# Patient Record
Sex: FEMALE | Race: WHITE | Employment: OTHER | URBAN - METROPOLITAN AREA
[De-identification: names, ages, dates, MRNs, and addresses within clinical notes are randomized per-mention and may not be internally consistent; named-entity substitution may affect disease eponyms.]

---

## 2018-06-12 RX ORDER — DIPHENOXYLATE HYDROCHLORIDE AND ATROPINE SULFATE 2.5; .025 MG/1; MG/1
2 TABLET ORAL
COMMUNITY
End: 2019-09-18

## 2018-06-12 RX ORDER — BISMUTH SUBSALICYLATE 262 MG
1 TABLET,CHEWABLE ORAL DAILY
COMMUNITY
End: 2020-06-25 | Stop reason: ALTCHOICE

## 2018-06-12 RX ORDER — AMLODIPINE BESYLATE 10 MG/1
10 TABLET ORAL DAILY
COMMUNITY
End: 2019-09-18

## 2018-06-14 ENCOUNTER — ANESTHESIA EVENT (OUTPATIENT)
Dept: SURGERY | Age: 68
End: 2018-06-14
Payer: MEDICARE

## 2018-06-15 ENCOUNTER — APPOINTMENT (OUTPATIENT)
Dept: GENERAL RADIOLOGY | Age: 68
End: 2018-06-15
Attending: ORTHOPAEDIC SURGERY
Payer: MEDICARE

## 2018-06-15 ENCOUNTER — ANESTHESIA (OUTPATIENT)
Dept: SURGERY | Age: 68
End: 2018-06-15
Payer: MEDICARE

## 2018-06-15 ENCOUNTER — HOSPITAL ENCOUNTER (OUTPATIENT)
Age: 68
Setting detail: OUTPATIENT SURGERY
Discharge: HOME | End: 2018-06-15
Attending: ORTHOPAEDIC SURGERY | Admitting: ORTHOPAEDIC SURGERY
Payer: MEDICARE

## 2018-06-15 VITALS
HEIGHT: 64 IN | TEMPERATURE: 97.2 F | DIASTOLIC BLOOD PRESSURE: 85 MMHG | SYSTOLIC BLOOD PRESSURE: 141 MMHG | BODY MASS INDEX: 21.54 KG/M2 | RESPIRATION RATE: 16 BRPM | WEIGHT: 126.19 LBS | OXYGEN SATURATION: 98 % | HEART RATE: 66 BPM

## 2018-06-15 DIAGNOSIS — M77.42 METATARSALGIA OF LEFT FOOT: Primary | ICD-10-CM

## 2018-06-15 PROCEDURE — 74011000250 HC RX REV CODE- 250: Performed by: ORTHOPAEDIC SURGERY

## 2018-06-15 PROCEDURE — 77030018836 HC SOL IRR NACL ICUM -A: Performed by: ORTHOPAEDIC SURGERY

## 2018-06-15 PROCEDURE — 77030006773 HC BLD SAW OSC BRSM -A: Performed by: ORTHOPAEDIC SURGERY

## 2018-06-15 PROCEDURE — 77030032490 HC SLV COMPR SCD KNE COVD -B: Performed by: ORTHOPAEDIC SURGERY

## 2018-06-15 PROCEDURE — 77030020335 HC PDNG CST COVD -A: Performed by: ORTHOPAEDIC SURGERY

## 2018-06-15 PROCEDURE — 76060000034 HC ANESTHESIA 1.5 TO 2 HR: Performed by: ORTHOPAEDIC SURGERY

## 2018-06-15 PROCEDURE — C1713 ANCHOR/SCREW BN/BN,TIS/BN: HCPCS | Performed by: ORTHOPAEDIC SURGERY

## 2018-06-15 PROCEDURE — 74011250636 HC RX REV CODE- 250/636

## 2018-06-15 PROCEDURE — 76010000153 HC OR TIME 1.5 TO 2 HR: Performed by: ORTHOPAEDIC SURGERY

## 2018-06-15 PROCEDURE — 74011250637 HC RX REV CODE- 250/637: Performed by: NURSE ANESTHETIST, CERTIFIED REGISTERED

## 2018-06-15 PROCEDURE — 76210000021 HC REC RM PH II 0.5 TO 1 HR: Performed by: ORTHOPAEDIC SURGERY

## 2018-06-15 PROCEDURE — 77030012904 HC TAPE CST BSNM -A: Performed by: ORTHOPAEDIC SURGERY

## 2018-06-15 PROCEDURE — 74011250636 HC RX REV CODE- 250/636: Performed by: NURSE ANESTHETIST, CERTIFIED REGISTERED

## 2018-06-15 PROCEDURE — 74011000250 HC RX REV CODE- 250

## 2018-06-15 PROCEDURE — 74011250636 HC RX REV CODE- 250/636: Performed by: ORTHOPAEDIC SURGERY

## 2018-06-15 PROCEDURE — 77030014685 HC STIM BN GRWTH PHYSIO EXTERNAL ORTH -I1: Performed by: ORTHOPAEDIC SURGERY

## 2018-06-15 PROCEDURE — 73630 X-RAY EXAM OF FOOT: CPT

## 2018-06-15 PROCEDURE — 77030020754 HC CUF TRNQT 2BLA STRY -B: Performed by: ORTHOPAEDIC SURGERY

## 2018-06-15 PROCEDURE — 76210000006 HC OR PH I REC 0.5 TO 1 HR: Performed by: ORTHOPAEDIC SURGERY

## 2018-06-15 PROCEDURE — 77030020274 HC MISC IMPL ORTHOPEDIC: Performed by: ORTHOPAEDIC SURGERY

## 2018-06-15 DEVICE — GRAFT BNE SUB SM CANC FRZN MORSELIZED W/ VIABLE CELL: Type: IMPLANTABLE DEVICE | Site: FOOT | Status: FUNCTIONAL

## 2018-06-15 DEVICE — DYNAFORCE IS A NITINOL SUPERELASTIC BONE STAPLE FOR USE IN SURGERY OF THE HAND AND FOOT FOR BONE FRAGMENT OSTEOTOMY FIXATION AND JOINT ARTHRODESIS
Type: IMPLANTABLE DEVICE | Site: FOOT | Status: FUNCTIONAL
Brand: DYNAFORCE™

## 2018-06-15 RX ORDER — FAMOTIDINE 20 MG/1
TABLET, FILM COATED ORAL
Status: DISCONTINUED
Start: 2018-06-15 | End: 2018-06-15 | Stop reason: HOSPADM

## 2018-06-15 RX ORDER — CEFAZOLIN SODIUM 2 G/50ML
2 SOLUTION INTRAVENOUS
Status: COMPLETED | OUTPATIENT
Start: 2018-06-15 | End: 2018-06-15

## 2018-06-15 RX ORDER — OXYCODONE AND ACETAMINOPHEN 5; 325 MG/1; MG/1
1-2 TABLET ORAL
Qty: 50 TAB | Refills: 0 | Status: SHIPPED | OUTPATIENT
Start: 2018-06-15 | End: 2019-09-18

## 2018-06-15 RX ORDER — INSULIN LISPRO 100 [IU]/ML
INJECTION, SOLUTION INTRAVENOUS; SUBCUTANEOUS ONCE
Status: DISCONTINUED | OUTPATIENT
Start: 2018-06-15 | End: 2018-06-15 | Stop reason: HOSPADM

## 2018-06-15 RX ORDER — HYDROMORPHONE HYDROCHLORIDE 2 MG/ML
0.5 INJECTION, SOLUTION INTRAMUSCULAR; INTRAVENOUS; SUBCUTANEOUS
Status: DISCONTINUED | OUTPATIENT
Start: 2018-06-15 | End: 2018-06-15 | Stop reason: HOSPADM

## 2018-06-15 RX ORDER — LIDOCAINE HYDROCHLORIDE 20 MG/ML
INJECTION, SOLUTION EPIDURAL; INFILTRATION; INTRACAUDAL; PERINEURAL AS NEEDED
Status: DISCONTINUED | OUTPATIENT
Start: 2018-06-15 | End: 2018-06-15 | Stop reason: HOSPADM

## 2018-06-15 RX ORDER — BUPIVACAINE HYDROCHLORIDE 5 MG/ML
INJECTION, SOLUTION EPIDURAL; INTRACAUDAL AS NEEDED
Status: DISCONTINUED | OUTPATIENT
Start: 2018-06-15 | End: 2018-06-15 | Stop reason: HOSPADM

## 2018-06-15 RX ORDER — SODIUM CHLORIDE 0.9 % (FLUSH) 0.9 %
5-10 SYRINGE (ML) INJECTION AS NEEDED
Status: DISCONTINUED | OUTPATIENT
Start: 2018-06-15 | End: 2018-06-15 | Stop reason: HOSPADM

## 2018-06-15 RX ORDER — NALOXONE HYDROCHLORIDE 4 MG/.1ML
SPRAY NASAL
Qty: 1 EACH | Refills: 0 | Status: SHIPPED | OUTPATIENT
Start: 2018-06-15 | End: 2019-09-18

## 2018-06-15 RX ORDER — PROPOFOL 10 MG/ML
INJECTION, EMULSION INTRAVENOUS AS NEEDED
Status: DISCONTINUED | OUTPATIENT
Start: 2018-06-15 | End: 2018-06-15 | Stop reason: HOSPADM

## 2018-06-15 RX ORDER — SODIUM CHLORIDE, SODIUM LACTATE, POTASSIUM CHLORIDE, CALCIUM CHLORIDE 600; 310; 30; 20 MG/100ML; MG/100ML; MG/100ML; MG/100ML
100 INJECTION, SOLUTION INTRAVENOUS CONTINUOUS
Status: DISCONTINUED | OUTPATIENT
Start: 2018-06-15 | End: 2018-06-15 | Stop reason: HOSPADM

## 2018-06-15 RX ORDER — FENTANYL CITRATE 50 UG/ML
INJECTION, SOLUTION INTRAMUSCULAR; INTRAVENOUS AS NEEDED
Status: DISCONTINUED | OUTPATIENT
Start: 2018-06-15 | End: 2018-06-15 | Stop reason: HOSPADM

## 2018-06-15 RX ORDER — ONDANSETRON 2 MG/ML
INJECTION INTRAMUSCULAR; INTRAVENOUS AS NEEDED
Status: DISCONTINUED | OUTPATIENT
Start: 2018-06-15 | End: 2018-06-15 | Stop reason: HOSPADM

## 2018-06-15 RX ORDER — ONDANSETRON 2 MG/ML
4 INJECTION INTRAMUSCULAR; INTRAVENOUS ONCE
Status: DISCONTINUED | OUTPATIENT
Start: 2018-06-15 | End: 2018-06-15 | Stop reason: HOSPADM

## 2018-06-15 RX ORDER — SODIUM CHLORIDE, SODIUM LACTATE, POTASSIUM CHLORIDE, CALCIUM CHLORIDE 600; 310; 30; 20 MG/100ML; MG/100ML; MG/100ML; MG/100ML
50 INJECTION, SOLUTION INTRAVENOUS CONTINUOUS
Status: DISCONTINUED | OUTPATIENT
Start: 2018-06-16 | End: 2018-06-15 | Stop reason: HOSPADM

## 2018-06-15 RX ORDER — LIDOCAINE HYDROCHLORIDE 10 MG/ML
INJECTION INFILTRATION; PERINEURAL AS NEEDED
Status: DISCONTINUED | OUTPATIENT
Start: 2018-06-15 | End: 2018-06-15 | Stop reason: HOSPADM

## 2018-06-15 RX ORDER — SODIUM CHLORIDE 0.9 % (FLUSH) 0.9 %
5-10 SYRINGE (ML) INJECTION EVERY 8 HOURS
Status: DISCONTINUED | OUTPATIENT
Start: 2018-06-15 | End: 2018-06-15 | Stop reason: HOSPADM

## 2018-06-15 RX ORDER — NALOXONE HYDROCHLORIDE 0.4 MG/ML
0.1 INJECTION, SOLUTION INTRAMUSCULAR; INTRAVENOUS; SUBCUTANEOUS AS NEEDED
Status: DISCONTINUED | OUTPATIENT
Start: 2018-06-15 | End: 2018-06-15 | Stop reason: HOSPADM

## 2018-06-15 RX ORDER — EPHEDRINE SULFATE 50 MG/ML
INJECTION, SOLUTION INTRAVENOUS AS NEEDED
Status: DISCONTINUED | OUTPATIENT
Start: 2018-06-15 | End: 2018-06-15 | Stop reason: HOSPADM

## 2018-06-15 RX ORDER — FLUMAZENIL 0.1 MG/ML
0.2 INJECTION INTRAVENOUS
Status: DISCONTINUED | OUTPATIENT
Start: 2018-06-15 | End: 2018-06-15 | Stop reason: HOSPADM

## 2018-06-15 RX ORDER — ONDANSETRON 4 MG/1
4 TABLET, ORALLY DISINTEGRATING ORAL
Qty: 15 TAB | Refills: 0 | Status: SHIPPED | OUTPATIENT
Start: 2018-06-15 | End: 2019-09-18

## 2018-06-15 RX ORDER — MIDAZOLAM HYDROCHLORIDE 1 MG/ML
INJECTION, SOLUTION INTRAMUSCULAR; INTRAVENOUS AS NEEDED
Status: DISCONTINUED | OUTPATIENT
Start: 2018-06-15 | End: 2018-06-15 | Stop reason: HOSPADM

## 2018-06-15 RX ORDER — DEXTROSE MONOHYDRATE 25 G/50ML
25-50 INJECTION, SOLUTION INTRAVENOUS AS NEEDED
Status: DISCONTINUED | OUTPATIENT
Start: 2018-06-15 | End: 2018-06-15 | Stop reason: HOSPADM

## 2018-06-15 RX ORDER — FAMOTIDINE 20 MG/1
20 TABLET, FILM COATED ORAL ONCE
Status: COMPLETED | OUTPATIENT
Start: 2018-06-16 | End: 2018-06-15

## 2018-06-15 RX ORDER — MAGNESIUM SULFATE 100 %
4 CRYSTALS MISCELLANEOUS AS NEEDED
Status: DISCONTINUED | OUTPATIENT
Start: 2018-06-15 | End: 2018-06-15 | Stop reason: HOSPADM

## 2018-06-15 RX ADMIN — FENTANYL CITRATE 25 MCG: 50 INJECTION, SOLUTION INTRAMUSCULAR; INTRAVENOUS at 13:04

## 2018-06-15 RX ADMIN — FENTANYL CITRATE 50 MCG: 50 INJECTION, SOLUTION INTRAMUSCULAR; INTRAVENOUS at 12:21

## 2018-06-15 RX ADMIN — MIDAZOLAM HYDROCHLORIDE 2 MG: 1 INJECTION, SOLUTION INTRAMUSCULAR; INTRAVENOUS at 11:19

## 2018-06-15 RX ADMIN — FENTANYL CITRATE 50 MCG: 50 INJECTION, SOLUTION INTRAMUSCULAR; INTRAVENOUS at 11:35

## 2018-06-15 RX ADMIN — SODIUM CHLORIDE, SODIUM LACTATE, POTASSIUM CHLORIDE, AND CALCIUM CHLORIDE: 600; 310; 30; 20 INJECTION, SOLUTION INTRAVENOUS at 12:55

## 2018-06-15 RX ADMIN — LIDOCAINE HYDROCHLORIDE 40 MG: 20 INJECTION, SOLUTION EPIDURAL; INFILTRATION; INTRACAUDAL; PERINEURAL at 11:27

## 2018-06-15 RX ADMIN — CEFAZOLIN SODIUM 2 G: 2 SOLUTION INTRAVENOUS at 11:19

## 2018-06-15 RX ADMIN — FENTANYL CITRATE 50 MCG: 50 INJECTION, SOLUTION INTRAMUSCULAR; INTRAVENOUS at 11:39

## 2018-06-15 RX ADMIN — FAMOTIDINE 20 MG: 20 TABLET ORAL at 09:42

## 2018-06-15 RX ADMIN — ONDANSETRON 4 MG: 2 INJECTION INTRAMUSCULAR; INTRAVENOUS at 11:32

## 2018-06-15 RX ADMIN — PROPOFOL 50 MG: 10 INJECTION, EMULSION INTRAVENOUS at 11:31

## 2018-06-15 RX ADMIN — EPHEDRINE SULFATE 10 MG: 50 INJECTION, SOLUTION INTRAVENOUS at 11:44

## 2018-06-15 RX ADMIN — FENTANYL CITRATE 25 MCG: 50 INJECTION, SOLUTION INTRAMUSCULAR; INTRAVENOUS at 12:49

## 2018-06-15 RX ADMIN — PROPOFOL 150 MG: 10 INJECTION, EMULSION INTRAVENOUS at 11:27

## 2018-06-15 RX ADMIN — SODIUM CHLORIDE, SODIUM LACTATE, POTASSIUM CHLORIDE, AND CALCIUM CHLORIDE 50 ML/HR: 600; 310; 30; 20 INJECTION, SOLUTION INTRAVENOUS at 10:00

## 2018-06-15 NOTE — DISCHARGE INSTRUCTIONS
DISCHARGE SUMMARY from Nurse    PATIENT INSTRUCTIONS:    After general anesthesia or intravenous sedation, for 24 hours or while taking prescription Narcotics:  · Limit your activities  · Do not drive and operate hazardous machinery  · Do not make important personal or business decisions  · Do  not drink alcoholic beverages  · If you have not urinated within 8 hours after discharge, please contact your surgeon on call. Report the following to your surgeon:  · Excessive pain, swelling, redness or odor of or around the surgical area  · Temperature over 100.5  · Nausea and vomiting lasting longer than 4 hours or if unable to take medications  · Any signs of decreased circulation or nerve impairment to extremity: change in color, persistent  numbness, tingling, coldness or increase pain  · Any questions    What to do at Home:      These are general instructions for a healthy lifestyle:    No smoking/ No tobacco products/ Avoid exposure to second hand smoke  Surgeon General's Warning:  Quitting smoking now greatly reduces serious risk to your health. Obesity, smoking, and sedentary lifestyle greatly increases your risk for illness    A healthy diet, regular physical exercise & weight monitoring are important for maintaining a healthy lifestyle    You may be retaining fluid if you have a history of heart failure or if you experience any of the following symptoms:  Weight gain of 3 pounds or more overnight or 5 pounds in a week, increased swelling in our hands or feet or shortness of breath while lying flat in bed. Please call your doctor as soon as you notice any of these symptoms; do not wait until your next office visit. Recognize signs and symptoms of STROKE:    F-face looks uneven    A-arms unable to move or move unevenly    S-speech slurred or non-existent    T-time-call 911 as soon as signs and symptoms begin-DO NOT go       Back to bed or wait to see if you get better-TIME IS BRAIN.     Warning Signs of HEART ATTACK     Call 911 if you have these symptoms:   Chest discomfort. Most heart attacks involve discomfort in the center of the chest that lasts more than a few minutes, or that goes away and comes back. It can feel like uncomfortable pressure, squeezing, fullness, or pain.  Discomfort in other areas of the upper body. Symptoms can include pain or discomfort in one or both arms, the back, neck, jaw, or stomach.  Shortness of breath with or without chest discomfort.  Other signs may include breaking out in a cold sweat, nausea, or lightheadedness. Don't wait more than five minutes to call 911 - MINUTES MATTER! Fast action can save your life. Calling 911 is almost always the fastest way to get lifesaving treatment. Emergency Medical Services staff can begin treatment when they arrive -- up to an hour sooner than if someone gets to the hospital by car. The discharge information has been reviewed with the patient. The patient verbalized understanding. Discharge medications reviewed with the patient and appropriate educational materials and side effects teaching were provided. ___________________________________________________________________________________________________________________________________  Patient armband removed and given to patient to take home.   Patient was informed of the privacy risks if armband lost or stolen

## 2018-06-15 NOTE — IP AVS SNAPSHOT
[address] [place] [address] Dana Eubanksjatin Murphy 
[phone] Patient: Lidia Alvarado MRN: [id number] About your hospitalization You were admitted on:  Sheridan 15, 2018 You last received care in the:  [facility] PACU You were discharged on:  Sheridan 15, 2018 Why you were hospitalized Your primary diagnosis was:  Not on File Follow-up Information Follow up With Details Comments Contact Info Eric Taylor MD   [address] [address] 
[phone] Discharge Orders None A check shivam indicates which time of day the medication should be taken. My Medications START taking these medications Instructions Each Dose to Equal  
 Morning Noon Evening Bedtime  
 naloxone 4 mg/actuation nasal spray Commonly known as:  [employer] Your last dose was: Your next dose is:    
   
   
 Use 1 spray intranasally into 1 nostril. Use a new Narcan nasal spray for subsequent doses and administer into alternating nostrils. May repeat every 2 to 3 minutes as needed. Indications: OPIATE-INDUCED RESPIRATORY DEPRESSION  
     
   
   
   
  
 ondansetron 4 mg disintegrating tablet Commonly known as:  ZOFRAN ODT Your last dose was: Your next dose is: Take 1 Tab by mouth every six (6) hours as needed for Nausea. 4 mg  
    
   
   
   
  
 oxyCODONE-acetaminophen 5-325 mg per tablet Commonly known as:  PERCOCET Your last dose was: Your next dose is: Take 1-2 Tabs by mouth every four (4) hours as needed for Pain. Max Daily Amount: 12 Tabs. 1-2 Tab CONTINUE taking these medications Instructions Each Dose to Equal  
 Morning Noon Evening Bedtime  
 amLODIPine 10 mg tablet Commonly known as:  George Pacheco Your last dose was: Your next dose is: Take 10 mg by mouth daily.   
 10 mg  
 CALCIUM PO Your last dose was: Your next dose is: Take  by mouth. diphenoxylate-atropine 2.5-0.025 mg per tablet Commonly known as:  LOMOTIL Your last dose was: Your next dose is: Take 2 Tabs by mouth four (4) times daily as needed for Diarrhea. 2 Tab  
    
   
   
   
  
 multivitamin tablet Commonly known as:  ONE A DAY Your last dose was: Your next dose is: Take 1 Tab by mouth daily. 1 Tab  
    
   
   
   
  
 traZODone 100 mg tablet Commonly known as:  Rayma Medal Your last dose was: Your next dose is: Take 50 mg by mouth nightly. 50 mg  
    
   
   
   
  
 VITAMIN D2 PO Your last dose was: Your next dose is: Take  by mouth. Where to Get Your Medications Information on where to get these meds will be given to you by the nurse or doctor. ! Ask your nurse or doctor about these medications  
  naloxone 4 mg/actuation nasal spray  
 ondansetron 4 mg disintegrating tablet  
 oxyCODONE-acetaminophen 5-325 mg per tablet Opioid Education Prescription Opioids: What You Need to Know: 
 
 
After general anesthesia or intravenous sedation, for 24 hours or while taking prescription Narcotics: · Limit your activities · Do not drive and operate hazardous machinery · Do not make important personal or business decisions · Do  not drink alcoholic beverages · If you have not urinated within 8 hours after discharge, please contact your surgeon on call. Report the following to your surgeon: · Excessive pain, swelling, redness or odor of or around the surgical area · Temperature over 100.5 · Nausea and vomiting lasting longer than 4 hours or if unable to take medications · Any signs of decreased circulation or nerve impairment to extremity: change in color, persistent  numbness, tingling, coldness or increase pain · Any questions What to do at Home: These are general instructions for a healthy lifestyle: No smoking/ No tobacco products/ Avoid exposure to second hand smoke Surgeon General's Warning:  Quitting smoking now greatly reduces serious risk to your health. Obesity, smoking, and sedentary lifestyle greatly increases your risk for illness A healthy diet, regular physical exercise & weight monitoring are important for maintaining a healthy lifestyle You may be retaining fluid if you have a history of heart failure or if you experience any of the following symptoms:  Weight gain of 3 pounds or more overnight or 5 pounds in a week, increased swelling in our hands or feet or shortness of breath while lying flat in bed. Please call your doctor as soon as you notice any of these symptoms; do not wait until your next office visit. Recognize signs and symptoms of STROKE: 
 
F-face looks uneven A-arms unable to move or move unevenly S-speech slurred or non-existent T-time-call 911 as soon as signs and symptoms begin-DO NOT go Back to bed or wait to see if you get better-TIME IS BRAIN. Warning Signs of HEART ATTACK Call 911 if you have these symptoms: 
? Chest discomfort. Most heart attacks involve discomfort in the center of the chest that lasts more than a few minutes, or that goes away and comes back. It can feel like uncomfortable pressure, squeezing, fullness, or pain. ? Discomfort in other areas of the upper body. Symptoms can include pain or discomfort in one or both arms, the back, neck, jaw, or stomach. ? Shortness of breath with or without chest discomfort. ? Other signs may include breaking out in a cold sweat, nausea, or lightheadedness. Don't wait more than five minutes to call [address]! Fast action can save your life. Calling 911 is almost always the fastest way to get lifesaving treatment. Emergency Medical Services staff can begin treatment when they arrive  up to an hour sooner than if someone gets to the hospital by car. The discharge information has been reviewed with the patient. The patient verbalized understanding. Discharge medications reviewed with the patient and appropriate educational materials and side effects teaching were provided. ___________________________________________________________________________________________________________________________________ Patient armband removed and given to patient to take home. Patient was informed of the privacy risks if armband lost or stolen Introducing [facility]! Dear Krupa Victor Hugo: Thank you for requesting a Attensa account. Our records indicate that you already have an active Attensa account. You can access your account anytime at [email or web address] Did you know that you can access your hospital and ER discharge instructions at any time in Attensa? You can also review all of your test results from your hospital stay or ER visit. Additional Information If you have questions, please visit the Frequently Asked Questions section of the Attensa website at [email or web address] Remember, Attensa is NOT to be used for urgent needs. For medical emergencies, dial 911. Now available from your iPhone and Android! Introducing Joel Jones As a [facility]ureen Reusing patient, I wanted to make you aware of our electronic visit tool called Joel Jones. [facility] 24/7 allows you to connect within minutes with a medical provider 24 hours a day, seven days a week via a mobile device or tablet or logging into a secure website from your computer. You can access [employer] from anywhere in the [place]. A virtual visit might be right for you when you have a simple condition and feel like you just dont want to get out of bed, or cant get away from work for an appointment, when your regular Thersia Candelaria provider is not available (evenings, weekends or holidays), or when youre out of town and need minor care. Electronic visits cost only $49 and if the [employer] 24/7 provider determines a prescription is needed to treat your condition, one can be electronically transmitted to a nearby pharmacy*. Please take a moment to enroll today if you have not already done so. The enrollment process is free and takes just a few minutes. To enroll, please download the [employer] 24/7 sav to your tablet or phone, or visit [email or web address] to enroll on your computer. And, as an [address] [place] [address] patient with a Snoobe account, the results of your visits will be scanned into your electronic medical record and your primary care provider will be able to view the scanned results. We urge you to continue to see your regular Thersia Candelaria provider for your ongoing medical care. And while your primary care provider may not be the one available when you seek a [employer] virtual visit, the peace of mind you get from getting a real diagnosis real time can be priceless. For more information on Solar[employer], view our Frequently Asked Questions (FAQs) at [email or web address]. Sincerely, 
 
Michelle Robertson MD 
Chief Medical Officer [address] *:  certain medications cannot be prescribed via [employer] Unresulted tests-please follow up with your PCP on these results Procedure/Test Authorizing Provider  [place] XR TECHNOLOGIST Lavon Nuñez MD  
 XR FOOT LT MIN 3 V Flaca Chowdary MD  
  
Providers Seen During Your Hospitalization Provider Specialty Primary office phone Flaca Chowdary MD Orthopedic Surgery [phone] Your Primary Care Physician (PCP) Primary Care Physician Office Phone Office Fax Justyna Joshua [phone] You are allergic to the following No active allergies Recent Documentation Height Weight BMI OB Status Smoking Status 1.626 m 57.2 kg 21.66 kg/m2 Postmenopausal Never Smoker Emergency Contacts Name Discharge Info Relation Home Work Mobile [address] [facility]. CAREGIVER [3] Friend [[phone] Patient Belongings The following personal items are in your possession at time of discharge: 
  Dental Appliances: None  Visual Aid: Glasses      Home Medications: None   Jewelry: None  Clothing: Undergarments, Pants, Shirt, Slippers    Other Valuables: Home Medical Equipment(comment) (post op shoe and boot) Please provide this summary of care documentation to your next provider. Signatures-by signing, you are acknowledging that this After Visit Summary has been reviewed with you and you have received a copy. Patient Signature:  ____________________________________________________________ Date:  ____________________________________________________________  
  
Celesy Vaughn Provider Signature:  ____________________________________________________________ Date:  ____________________________________________________________

## 2018-06-15 NOTE — ANESTHESIA POSTPROCEDURE EVALUATION
Post-Anesthesia Evaluation and Assessment    Patient: Lidia Alvarado MRN: [id number]  SSN: [id number]    YOB: 1950  Age: 70 y.o. Sex: female      Data from PACU flowsheet    Cardiovascular Function/Vital Signs  Visit Vitals    /81    Pulse 85    Temp 36.2 °C (97.2 °F)    Resp 12    Ht 5' 4\" (1.626 m)    Wt 57.2 kg (126 lb 3 oz)    SpO2 97%    BMI 21.66 kg/m2       Patient is status post anesthesia    Nausea/Vomiting: controlled    Postoperative hydration reviewed and adequate. Pain:  Managed    Neurological Status: At baseline    Mental Status and Level of Consciousness: Alert and oriented     Pulmonary Status:   Adequate oxygenation and airway patent    Complications related to anesthesia: None    Post-anesthesia assessment completed.  No concerns    Signed By: Susanne Miranda CRNA     Sheridan 15, 2018

## 2018-06-15 NOTE — H&P
H&P Update:  Refugio Stock was seen and examined. History and physical has been reviewed. The patient has been examined. There have been no significant clinical changes since the completion of the originally dated History and Physical.  Dr Rosa Isela Harris has done H&P within 30 days - patient cleared.     Signed By: Beba Ascencio MD     Sheridan 15, 2018 9:35 AM

## 2018-06-15 NOTE — ANESTHESIA PREPROCEDURE EVALUATION
Anesthetic History     PONV          Review of Systems / Medical History  Patient summary reviewed, nursing notes reviewed and pertinent labs reviewed    Pulmonary          Shortness of breath         Neuro/Psych   Within defined limits           Cardiovascular    Hypertension: poorly controlled                   GI/Hepatic/Renal     GERD: well controlled           Endo/Other        Arthritis     Other Findings   Comments: Documentation of current medication  Current medications obtained, documented and obtained? YES      Risk Factors for Postoperative nausea/vomiting:       History of postoperative nausea/vomiting? yes       Female? YES       Motion sickness? NO       Intended opioid administration for postoperative analgesia? NO      Smoking Abstinence:  Current Smoker? NO  Elective Surgery? YES  Seen preoperatively by anesthesiologist or proxy prior to day of surgery? YES  Pt abstained from smoking 24 hours prior to anesthesia?  N/A    Preventive care/screening for High Blood Pressure:  Aged 18 years and older: YES  Screened for high blood pressure: YES  Patients with high blood pressure referred to primary care provider   for BP management: YES                 Physical Exam    Airway  Mallampati: II  TM Distance: 4 - 6 cm  Neck ROM: normal range of motion   Mouth opening: Normal     Cardiovascular  Regular rate and rhythm,  S1 and S2 normal,  no murmur, click, rub, or gallop  Rhythm: regular  Rate: normal         Dental  No notable dental hx       Pulmonary  Breath sounds clear to auscultation               Abdominal  GI exam deferred       Other Findings            Anesthetic Plan    ASA: 3  Anesthesia type: general          Induction: Intravenous  Anesthetic plan and risks discussed with: Patient

## 2018-06-15 NOTE — BRIEF OP NOTE
BRIEF OPERATIVE NOTE    Date of Procedure: 6/15/2018   Preoperative Diagnosis: m79.605,m21.612,m19.072,z98.890  Postoperative Diagnosis: * No post-op diagnosis entered *    Procedure(s):  2/3 METATARSAL shortening OSTEOTOMies and akin osteotomy with ankle block  Surgeon(s) and Role:     * Glory Mead MD - Primary         Surgical Assistant: Myriam Kim    Surgical Staff:  Circ-1: Luciana Nuno  Radiology Technician: Yudi Mcclellan  Scrub Tech-Relief: Keith Salvador  Surg Asst-1: Blank Archibald  Event Time In   Incision Start 1142   Incision Close      Anesthesia: General   Estimated Blood Loss: 10 mL  Specimens: * No specimens in log *   Findings: good correction of metatarsal station   Complications: none  Implants:   Implant Name Type Inv.  Item Serial No.  Lot No. LRB No. Used Action   IMPL KT [id number] -- DYNA[employer]CE - [id number]  IMPL KT [id number] -- DYNAFORCE  [employer] [id number] N/A 1 Implanted   Corical headed screw 02.4mm x18mm     [id number] N/A 1 Implanted   Locking screw 02.4mm x 16mm     [id number] Left 1 Implanted   Locking screw 02.4mm x 12mm     [id number] Left 1 Implanted   Locking Screw 02.4mmx 16mm     [id number] Left 1 Implanted   Headed screw      [id number] Left 1 Implanted   Headed Screw     [id number] Left 1 Implanted   Locking Screw 02.4mm x 14mm     [id number] Left 1 Implanted   MSPMetatarsal Shortening System      [id number] Left 1 Implanted   MSP Metarsal Shortening System      [id number] Left 1 Implanted   Headed Screw 02.4mm x 18mm     [id number] Left 1 Implanted   GRAFT BNE ELITE MIAN SM --  - [id number]   GRAFT BNE ELITE MIAN SM --  [id number] MUSCULOSKELETAL TRANS   Left 1 Implanted

## 2018-06-15 NOTE — PERIOP NOTES
Rec'd care of pt from OR via stretcher. Attached to monitor. VSS. OR, MAR and anesthesia report acknowledged. Will cont to monitor.

## 2018-06-16 NOTE — OP NOTES
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 POSTOPERATIVE DIAGNOSIS:  Second and third metatarsalgia and hallux valgus interphalangeus deformity, left foot -- patient with previous history of a complex forefoot reconstruction surgery. PROCEDURE PERFORMED:  Left 2nd and 3rd metatarsal shortening osteotomies and Akin osteotomy. SURGEON:  Tiffany Poole MD    ASSISTANT:  Naveed Dallas CSA    ANESTHESIA:  LMA with ankle block. ESTIMATED BLOOD LOSS:  Minimal.    FLUIDS:  Crystalloid. URINE OUTPUT:  None. SPECIMENS REMOVED:  None. FINDINGS:  Good correction in metatarsal position achieved -- great toe no longer impinging on the second. COMPLICATIONS:  None. CONDITION:  Stable to PACU.    ESTIMATED BLOOD LOSS:  10 mL. IMPLANTS:  none    INDICATIONS AND HISTORY:  The patient is a 27-year-old who has previously undergone multiple midfoot surgeries and forefoot surgeries by Dr. Jaleel Stauffer. She presented to me with persistent pain under the second and third toes, and complaints that the great toe was impinging on the second toe causing discomfort. She has already been treated with a toe spacer in addition to custom orthotics, metatarsal pads and shoe wear modification by Dr. Jaleel Stauffer. After discussing the alternatives, my recommendation was for shortening osteotomies of the 2nd and 3rd metatarsals and a large Akin osteotomy to correct her issues. Prior to surgery, I discussed in clear and certain terms that she has severe foot problems and there is no surgery available that will make her foot normal.  I think Dr. Jaleel Stauffer has done a fantastic job of correcting her severe midfoot deformity.   At this point, my goal was to finetune the forefoot to try to minimize pain. I clearly expressed to her that I had no expectations that she would be able to walk barefoot in the future without pain under any of her metatarsal heads due to fat pad atrophy. I additionally expressed that I think she will likely need lifetime custom orthotics due to the unusual shape of her foot. That being said, I thought osteotomies of the 2nd and 3rd metatarsals to shorten them would help with the significant and metatarsalgia symptoms she was experiencing under both of them. The Baljit osteotomy, I thought, would be a reliable way to move the great toe off the second. I discussed the possibility of nonunion, wound healing complications, nerve injury, the need for future surgery, PE, DVT, chronic pain and the loss of limb or life. DESCRIPTION OF PROCEDURE:  The patient was identified and the procedure was verified. After successful induction of LMA anesthesia, the patient's left foot was prepped and draped in routine orthopedic sterile fashion. I performed an ankle block. An Esmarch was used to exsanguinate the extremity and ankle tourniquet was inflated. I made an incision over the dorsal midfoot through a previous incision Dr. Victor M Bonilla had made. Through this, I was able to easily access the 2nd and 3rd metatarsal shafts dorsally. Bovie electrocautery was used to coagulate some bleeding vessels and scar tissue. The 2nd metatarsal was addressed first.  With it exposed, I placed an extremity metatarsal shortening plate and confirmed its position radiographically. I then fixated it distally with 2 bicortical locking screws and proximally with a bicortical nonlocking screw. I then backed the screw off several turns so the plate would be able to translate proximally. At this point in time, I used the microsagittal saw to create an oblique osteotomy using the plate as a guide.   Once the osteotomy was completed, I translated the plate proximally, until plantarly it felt like the 2nd metatarsal head was at an equal height as the 1st.  I then tightened down the proximal screw in the oval-shaped hole. I then placed a second screw proximally, and this was a nonlocking screw. I compressed the osteotomy and then drilled this screw eccentrically on the lateral side of the hole. This screw was inserted, had excellent purchase and compressed the osteotomy nicely. It should be noted that a small amount of Nely Elite was packed into the osteotomy prior to doing this, and then around the osteotomy. At this point in time, I performed the identical procedure for the 3rd metatarsal.  At this point, attention was turned to the Akin osteotomy. A medial incision was made along the hallux. The medial aspect of the proximal phalanx of the hallux was carefully exposed, and blunt dissection was performed to protect any local nerve branches or arteries. Two Hohmann retractors were placed to protect the surrounding soft tissue, and I performed an Akin osteotomy -- I intentionally completed the osteotomy to provide slight lateral translation of the distal fragment and fixated it with a [place] staple. With pressure under the 4th metatarsal head, the great toe sat off of the second toe with no impingement. With palpation under the surface of the metatarsals, there was no prominence of any metatarsal -- all were at an equal height. At this point, final x-rays were obtained, the wounds were irrigated and closed in a layered fashion and a well-padded dressing was applied. POSTOPERATIVE PLAN:  The patient will go into a Cam boot. We will keep her in the Cam boot until her 2-month postop visit, at which point x-rays will be obtained, and subsequent use of the Cam boot will be determined based on evidence of healing. The patient was instructed and DVT prophylaxis and to move when awake and use aspirin for DVT prophylaxis.       Milagros Rousseau, MD [facility]  D: 06/16/2018 11:15     T: 06/16/2018 11:55  JOB #: [id number]

## 2019-11-22 ENCOUNTER — OFFICE VISIT (OUTPATIENT)
Dept: FAMILY MEDICINE CLINIC | Age: 69
End: 2019-11-22

## 2019-11-22 VITALS
OXYGEN SATURATION: 97 % | HEART RATE: 71 BPM | HEIGHT: 64 IN | DIASTOLIC BLOOD PRESSURE: 87 MMHG | BODY MASS INDEX: 22.71 KG/M2 | WEIGHT: 133 LBS | SYSTOLIC BLOOD PRESSURE: 141 MMHG | RESPIRATION RATE: 16 BRPM | TEMPERATURE: 96.7 F

## 2019-11-22 DIAGNOSIS — G47.9 SLEEP DISTURBANCE: ICD-10-CM

## 2019-11-22 DIAGNOSIS — I10 ESSENTIAL HYPERTENSION: Primary | ICD-10-CM

## 2019-11-22 DIAGNOSIS — E78.49 OTHER HYPERLIPIDEMIA: ICD-10-CM

## 2019-11-22 DIAGNOSIS — N39.498 OTHER URINARY INCONTINENCE: ICD-10-CM

## 2019-11-22 RX ORDER — LISINOPRIL 10 MG/1
10 TABLET ORAL DAILY
Qty: 90 TAB | Refills: 1 | Status: SHIPPED | OUTPATIENT
Start: 2019-11-22 | End: 2020-05-26 | Stop reason: SDUPTHER

## 2019-11-22 RX ORDER — LANOLIN ALCOHOL/MO/W.PET/CERES
3 CREAM (GRAM) TOPICAL
COMMUNITY

## 2019-11-22 NOTE — PROGRESS NOTES
Rodolfo Baker is a 78 y.o. female (: 1950) presenting to address:    Chief Complaint   Patient presents with    Establish Care    Hypertension       Vitals:    19 1322   BP: 141/87   Pulse: 71   Resp: 16   Temp: 96.7 °F (35.9 °C)   TempSrc: Oral   SpO2: 97%   Weight: 133 lb (60.3 kg)   Height: 5' 3.5\" (1.613 m)   PainSc:   0 - No pain       Hearing/Vision:   No exam data present    Learning Assessment:     Learning Assessment 2019   PRIMARY LEARNER Patient   HIGHEST LEVEL OF EDUCATION - PRIMARY LEARNER  GRADUATED HIGH SCHOOL OR GED   BARRIERS PRIMARY LEARNER NONE   PRIMARY LANGUAGE ENGLISH   LEARNER PREFERENCE PRIMARY READING   ANSWERED BY patient    RELATIONSHIP SELF     Depression Screening:     3 most recent PHQ Screens 2019   Little interest or pleasure in doing things Not at all   Feeling down, depressed, irritable, or hopeless Not at all   Total Score PHQ 2 0     Fall Risk Assessment:     Fall Risk Assessment, last 12 mths 2019   Able to walk? Yes   Fall in past 12 months? Yes   Fall with injury? No   Number of falls in past 12 months 1   Fall Risk Score 1     Abuse Screening:     Abuse Screening Questionnaire 2019   Do you ever feel afraid of your partner? N   Are you in a relationship with someone who physically or mentally threatens you? N   Is it safe for you to go home? Y     Coordination of Care Questionaire:   1. Have you been to the ER, urgent care clinic since your last visit? Hospitalized since your last visit? NO    2. Have you seen or consulted any other health care providers outside of the [address] [place] [address] since your last visit? Include any pap smears or colon screening. NO    Advanced Directive:   1. Do you have an Advanced Directive? YES    2. Would you like information on Advanced Directives?  NO

## 2019-11-22 NOTE — PATIENT INSTRUCTIONS
High Blood Pressure: Care Instructions Overview It's normal for blood pressure to go up and down throughout the day. But if it stays up, you have high blood pressure. Another name for high blood pressure is hypertension. Despite what a lot of people think, high blood pressure usually doesn't cause headaches or make you feel dizzy or lightheaded. It usually has no symptoms. But it does increase your risk of stroke, heart attack, and other problems. You and your doctor will talk about your risks of these problems based on your blood pressure. Your doctor will give you a goal for your blood pressure. Your goal will be based on your health and your age. Lifestyle changes, such as eating healthy and being active, are always important to help lower blood pressure. You might also take medicine to reach your blood pressure goal. 
Follow-up care is a key part of your treatment and safety. Be sure to make and go to all appointments, and call your doctor if you are having problems. It's also a good idea to know your test results and keep a list of the medicines you take. How can you care for yourself at home? Medical treatment · If you stop taking your medicine, your blood pressure will go back up. You may take one or more types of medicine to lower your blood pressure. Be safe with medicines. Take your medicine exactly as prescribed. Call your doctor if you think you are having a problem with your medicine. · Talk to your doctor before you start taking aspirin every day. Aspirin can help certain people lower their risk of a heart attack or stroke. But taking aspirin isn't right for everyone, because it can cause serious bleeding. · See your doctor regularly. You may need to see the doctor more often at first or until your blood pressure comes down. · If you are taking blood pressure medicine, talk to your doctor before you take decongestants or anti-inflammatory medicine, such as ibuprofen. Some of these medicines can raise blood pressure. · Learn how to check your blood pressure at home. Lifestyle changes · Stay at a healthy weight. This is especially important if you put on weight around the waist. Losing even 10 pounds can help you lower your blood pressure. · If your doctor recommends it, get more exercise. Walking is a good choice. Bit by bit, increase the amount you walk every day. Try for at least 30 minutes on most days of the week. You also may want to swim, bike, or do other activities. · Avoid or limit alcohol. Talk to your doctor about whether you can drink any alcohol. · Try to limit how much sodium you eat to less than 2,300 milligrams (mg) a day. Your doctor may ask you to try to eat less than 1,500 mg a day. · Eat plenty of fruits (such as bananas and oranges), vegetables, legumes, whole grains, and low-fat dairy products. · Lower the amount of saturated fat in your diet. Saturated fat is found in animal products such as milk, cheese, and meat. Limiting these foods may help you lose weight and also lower your risk for heart disease. · Do not smoke. Smoking increases your risk for heart attack and stroke. If you need help quitting, talk to your doctor about stop-smoking programs and medicines. These can increase your chances of quitting for good. When should you call for help? Call  911 anytime you think you may need emergency care. This may mean having symptoms that suggest that your blood pressure is causing a serious heart or blood vessel problem. Your blood pressure may be over 180/120. 
 For example, call  911 if: 
  · You have symptoms of a heart attack. These may include: 
? Chest pain or pressure, or a strange feeling in the chest. 
? Sweating. ? Shortness of breath. ? Nausea or vomiting. ? Pain, pressure, or a strange feeling in the back, neck, jaw, or upper belly or in one or both shoulders or arms. ? Lightheadedness or sudden weakness. ? A fast or irregular heartbeat.  
  · You have symptoms of a stroke. These may include: 
? Sudden numbness, tingling, weakness, or loss of movement in your face, arm, or leg, especially on only one side of your body. ? Sudden vision changes. ? Sudden trouble speaking. ? Sudden confusion or trouble understanding simple statements. ? Sudden problems with walking or balance. ? A sudden, severe headache that is different from past headaches.  
  · You have severe back or belly pain.  
 Do not wait until your blood pressure comes down on its own. Get help right away. 
 Call your doctor now or seek immediate care if: 
  · Your blood pressure is much higher than normal (such as 180/120 or higher), but you don't have symptoms.  
  · You think high blood pressure is causing symptoms, such as: 
? Severe headache. 
? Blurry vision.  
 Watch closely for changes in your health, and be sure to contact your doctor if: 
  · Your blood pressure measures higher than your doctor recommends at least 2 times. That means the top number is higher or the bottom number is higher, or both.  
  · You think you may be having side effects from your blood pressure medicine. Where can you learn more? Go to http://nirali-evert.info/. Enter G980 in the search box to learn more about \"High Blood Pressure: Care Instructions. \" Current as of: April 9, 2019 Content Version: 12.2 © 5205-2676 [employer]. Care instructions adapted under license by [employer] (which disclaims liability or warranty for this information). If you have questions about a medical condition or this instruction, always ask your healthcare professional. [place] [address] any warranty or liability for your use of this information.

## 2019-11-22 NOTE — PROGRESS NOTES
Assessment/Plan:    *Diagnoses and all orders for this visit:    1. Essential hypertension    2. Other hyperlipidemia    3. Other urinary incontinence    4. Sleep disturbance    Other orders  -     lisinopril (PRINIVIL, ZESTRIL) 10 mg tablet; Take 1 Tab by mouth daily. F/u in 4 weeks. Will add Lisinopril to her regimen and see how this does. The plan was discussed with the patient. The patient verbalized understanding and is in agreement with the plan. All medication potential side effects were discussed with the patient.    -------------------------------------------------------------------------------------------------------------------        Marleni Ruano is a 78 y.o. female and presents with [facility] and Hypertension         Subjective:  Pt here to establish with a new PCP. HTN: she reports that over the years, she has been given various different meds in an attempt to get her readings to goal.  She has not had good readings lately. HLD:  On Zocor. Will plan on labs later. She has issues with sleep so uses Trazodone. It works well. Has urinary incontinence, she sees urology and is working on getting a Rx from them. Is going to see Ortho and is working on probably getting her LT hip replaced soon. ROS:  Review of Systems - Negative except as above        The problem list was updated as a part of today's visit.   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 SH:  Social History     Tobacco Use    Smoking status: Never Smoker    Smokeless tobacco: Never Used   Substance Use Topics    Alcohol use: Yes     Comment: 1 monthly    Drug use: No       Medications/Allergies:  Current Outpatient Medications on File Prior to Visit   Medication Sig Dispense Refill    melatonin 3 mg tablet Take 3 mg by mouth nightly.  verapamil ER (CALAN-SR) 180 mg CR tablet       simvastatin (ZOCOR) 5 mg tablet TAKE 1 TABLET BY MOUTH ONCE DAILY AT BEDTIME      multivitamin (ONE A DAY) tablet Take 1 Tab by mouth daily.  ergocalciferol, vitamin D2, (VITAMIN D2 PO) Take  by mouth.  traZODone (DESYREL) 50 mg tablet Take 100 mg by mouth nightly.  [DISCONTINUED] tolterodine ER (DETROL LA) 4 mg ER capsule Take 1 Cap by mouth daily. 90 Cap 0     No current facility-administered medications on file prior to visit.          No Known Allergies      Health Maintenance:   Health Maintenance   Topic Date Due    Hepatitis C Screening  1950    Shingrix Vaccine Age 50> (1 of 2) 05/05/1999    BREAST CANCER SCRN MAMMOGRAM  05/05/1999    FOBT Q 1 YEAR AGE 50-75  05/05/1999    GLAUCOMA SCREENING Q2Y  05/05/2014    Bone Densitometry (Dexa) Screening  05/05/2014    MEDICARE YEARLY EXAM  10/18/2018    DTaP/Tdap/Td series (3 - Td) 08/20/2027    Influenza Age 5 to Adult  Completed    Pneumococcal 65+ years  Completed       Objective:  Visit Vitals  /87 (BP 1 Location: Left arm, BP Patient Position: Sitting)   Pulse 71   Temp 96.7 °F (35.9 °C) (Oral)   Resp 16   Ht 5' 3.5\" (1.613 m)   Wt 133 lb (60.3 kg)   SpO2 97%   BMI 23.19 kg/m²          Nurses notes and VS reviewed. Physical Examination: General appearance - alert, well appearing, and in no distress  Chest - clear to auscultation, no wheezes, rales or rhonchi, symmetric air entry  Heart - normal rate, regular rhythm, normal S1, S2, no murmurs, rubs, clicks or gallops          Labwork and Ancillary Studies:    CBC w/Diff  Lab Results   Component Value Date/Time    WBC 6.5 10/13/2015 12:57 PM    HGB 14.0 10/13/2015 12:57 PM    PLATELET 920 37/54/4517 12:57 PM         Basic Metabolic Profile  Lab Results   Component Value Date/Time    Sodium 140 10/21/2015 03:13 AM    Potassium 3.8 10/21/2015 03:13 AM    Chloride 106 10/21/2015 03:13 AM    CO2 26 10/21/2015 03:13 AM    Anion gap 6 09/28/2012 02:49 AM    Glucose 95 10/21/2015 03:13 AM    BUN 15 10/21/2015 03:13 AM    Creatinine 0.7 10/21/2015 03:13 AM    BUN/Creatinine ratio 12 09/28/2012 02:49 AM    GFR est AA >60.0 10/21/2015 03:13 AM    GFR est non-AA >60 10/21/2015 03:13 AM    Calcium 8.3 (L) 10/21/2015 03:13 AM         LFT  Lab Results   Component Value Date/Time    ALT (SGPT) 36 09/05/2012 01:44 PM    AST (SGOT) 18 09/05/2012 01:44 PM    Alk.  phosphatase 112 09/05/2012 01:44 PM    Bilirubin, total 0.4 09/05/2012 01:44 PM         Cholesterol  No results found for: CHOL, CHOLX, CHLST, CHOLV, HDL, HDLP, LDL, LDLC, DLDLP, TGLX, TRIGL, TRIGP, CHHD, CHHDX

## 2019-12-16 ENCOUNTER — OFFICE VISIT (OUTPATIENT)
Dept: FAMILY MEDICINE CLINIC | Age: 69
End: 2019-12-16

## 2019-12-16 VITALS
BODY MASS INDEX: 22.71 KG/M2 | SYSTOLIC BLOOD PRESSURE: 130 MMHG | RESPIRATION RATE: 16 BRPM | HEIGHT: 64 IN | WEIGHT: 133 LBS | HEART RATE: 65 BPM | TEMPERATURE: 97.2 F | DIASTOLIC BLOOD PRESSURE: 82 MMHG | OXYGEN SATURATION: 99 %

## 2019-12-16 DIAGNOSIS — I10 ESSENTIAL HYPERTENSION: Primary | ICD-10-CM

## 2019-12-16 NOTE — PATIENT INSTRUCTIONS
High Blood Pressure: Care Instructions  Overview    It's normal for blood pressure to go up and down throughout the day. But if it stays up, you have high blood pressure. Another name for high blood pressure is hypertension. Despite what a lot of people think, high blood pressure usually doesn't cause headaches or make you feel dizzy or lightheaded. It usually has no symptoms. But it does increase your risk of stroke, heart attack, and other problems. You and your doctor will talk about your risks of these problems based on your blood pressure. Your doctor will give you a goal for your blood pressure. Your goal will be based on your health and your age. Lifestyle changes, such as eating healthy and being active, are always important to help lower blood pressure. You might also take medicine to reach your blood pressure goal.  Follow-up care is a key part of your treatment and safety. Be sure to make and go to all appointments, and call your doctor if you are having problems. It's also a good idea to know your test results and keep a list of the medicines you take. How can you care for yourself at home? Medical treatment  · If you stop taking your medicine, your blood pressure will go back up. You may take one or more types of medicine to lower your blood pressure. Be safe with medicines. Take your medicine exactly as prescribed. Call your doctor if you think you are having a problem with your medicine. · Talk to your doctor before you start taking aspirin every day. Aspirin can help certain people lower their risk of a heart attack or stroke. But taking aspirin isn't right for everyone, because it can cause serious bleeding. · See your doctor regularly. You may need to see the doctor more often at first or until your blood pressure comes down. · If you are taking blood pressure medicine, talk to your doctor before you take decongestants or anti-inflammatory medicine, such as ibuprofen.  Some of these medicines can raise blood pressure. · Learn how to check your blood pressure at home. Lifestyle changes  · Stay at a healthy weight. This is especially important if you put on weight around the waist. Losing even 10 pounds can help you lower your blood pressure. · If your doctor recommends it, get more exercise. Walking is a good choice. Bit by bit, increase the amount you walk every day. Try for at least 30 minutes on most days of the week. You also may want to swim, bike, or do other activities. · Avoid or limit alcohol. Talk to your doctor about whether you can drink any alcohol. · Try to limit how much sodium you eat to less than 2,300 milligrams (mg) a day. Your doctor may ask you to try to eat less than 1,500 mg a day. · Eat plenty of fruits (such as bananas and oranges), vegetables, legumes, whole grains, and low-fat dairy products. · Lower the amount of saturated fat in your diet. Saturated fat is found in animal products such as milk, cheese, and meat. Limiting these foods may help you lose weight and also lower your risk for heart disease. · Do not smoke. Smoking increases your risk for heart attack and stroke. If you need help quitting, talk to your doctor about stop-smoking programs and medicines. These can increase your chances of quitting for good. When should you call for help? Call  911 anytime you think you may need emergency care. This may mean having symptoms that suggest that your blood pressure is causing a serious heart or blood vessel problem. Your blood pressure may be over 180/120.   For example, call  911 if:    · You have symptoms of a heart attack. These may include:  ? Chest pain or pressure, or a strange feeling in the chest.  ? Sweating. ? Shortness of breath. ? Nausea or vomiting. ? Pain, pressure, or a strange feeling in the back, neck, jaw, or upper belly or in one or both shoulders or arms. ? Lightheadedness or sudden weakness.   ? A fast or irregular heartbeat.     · You have symptoms of a stroke. These may include:  ? Sudden numbness, tingling, weakness, or loss of movement in your face, arm, or leg, especially on only one side of your body. ? Sudden vision changes. ? Sudden trouble speaking. ? Sudden confusion or trouble understanding simple statements. ? Sudden problems with walking or balance. ? A sudden, severe headache that is different from past headaches.     · You have severe back or belly pain.    Do not wait until your blood pressure comes down on its own. Get help right away.   Call your doctor now or seek immediate care if:    · Your blood pressure is much higher than normal (such as 180/120 or higher), but you don't have symptoms.     · You think high blood pressure is causing symptoms, such as:  ? Severe headache.  ? Blurry vision.    Watch closely for changes in your health, and be sure to contact your doctor if:    · Your blood pressure measures higher than your doctor recommends at least 2 times. That means the top number is higher or the bottom number is higher, or both.     · You think you may be having side effects from your blood pressure medicine. Where can you learn more? Go to http://nirali-evert.info/. Enter L633 in the search box to learn more about \"High Blood Pressure: Care Instructions. \"  Current as of: April 9, 2019  Content Version: 12.2  © 3335-9864 [employer]. Care instructions adapted under license by [employer] (which disclaims liability or warranty for this information). If you have questions about a medical condition or this instruction, always ask your healthcare professional. [place] [address] any warranty or liability for your use of this information.

## 2019-12-16 NOTE — PROGRESS NOTES
Assessment/Plan:    *Diagnoses and all orders for this visit:    1. Essential hypertension        On further discussion, it was mutually decided that no increase would be made to her meds. She wanted to monitor things further. F/u 4 weeks. The plan was discussed with the patient. The patient verbalized understanding and is in agreement with the plan. All medication potential side effects were discussed with the patient.    -------------------------------------------------------------------------------------------------------------------        Rosalie Lo is a 78 y.o. female and presents with Hypertension and Dizziness (saturday room was spinning  . yesterday was off balance better by evening . )         Subjective:  Pt here for f/u of her HTN. We added Lisinopril 10 mg to her Verapamil. She is tolerating this well. She has several well controlled readings but over the weekend, developed some lightheadedness / dizziness. Her readings were higher as a result. Review of Systems - Negative except lightheadedness         The problem list was updated as a part of today's visit.   Patient Active Problem List   Diagnosis Code    Mixed hyperlipidemia E78.2    Benign hypertension I10    Senile osteoporosis M81.0    Osteoarthrosis, unspecified whether generalized or localized, unspecified site M19.90    SOB (shortness of breath) R06.02    Upper respiratory tract infection J06.9    Osteopenia M85.80    Cutaneous malignant melanoma ([facility] [address] C43.9    Dyspnea R06.00    Hypertension I10    Esophageal yeast infection ([facility] [address] B37.81    Visual disturbance H53.9    Difficulty hearing H91.90    Constipation K59.00    Diarrhea R19.7    Bone pain M89.8X9    Muscular weakness M62.81    Skin rash R21    Numbness R20.0    Excessive urinary volume R35.8    Heat intolerance R68.89    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 SH:  Social History     Tobacco Use    Smoking status: Never Smoker    Smokeless tobacco: Never Used   Substance Use Topics    Alcohol use: Yes     Comment: 1 monthly    Drug use: No       Medications/Allergies:  Current Outpatient Medications on File Prior to Visit   Medication Sig Dispense Refill    melatonin 3 mg tablet Take 3 mg by mouth nightly.  lisinopril (PRINIVIL, ZESTRIL) 10 mg tablet Take 1 Tab by mouth daily. 90 Tab 1    verapamil ER (CALAN-SR) 180 mg CR tablet       simvastatin (ZOCOR) 5 mg tablet TAKE 1 TABLET BY MOUTH ONCE DAILY AT BEDTIME      multivitamin (ONE A DAY) tablet Take 1 Tab by mouth daily.  ergocalciferol, vitamin D2, (VITAMIN D2 PO) Take  by mouth.  traZODone (DESYREL) 50 mg tablet Take 100 mg by mouth nightly. No current facility-administered medications on file prior to visit. No Known Allergies      Health Maintenance:   Health Maintenance   Topic Date Due    Hepatitis C Screening  1950    Shingrix Vaccine Age 50> (1 of 2) 05/05/1999    BREAST CANCER SCRN MAMMOGRAM  05/05/1999    FOBT Q 1 YEAR AGE 50-75  05/05/1999    GLAUCOMA SCREENING Q2Y  05/05/2014    Bone Densitometry (Dexa) Screening  05/05/2014    MEDICARE YEARLY EXAM  10/18/2018    DTaP/Tdap/Td series (3 - Td) 08/20/2027    Influenza Age 5 to Adult  Completed    Pneumococcal 65+ years  Completed       Objective:  Visit Vitals  /82 (BP 1 Location: Left arm, BP Patient Position: Sitting)   Pulse 65   Temp 97.2 °F (36.2 °C) (Oral)   Resp 16   Ht 5' 3.5\" (1.613 m)   Wt 133 lb (60.3 kg)   SpO2 99%   BMI 23.19 kg/m²          Nurses notes and VS reviewed.       Physical Examination: General appearance - alert, well appearing, and in no distress  Chest - clear to auscultation, no wheezes, rales or rhonchi, symmetric air entry  Heart - normal rate, regular rhythm, normal S1, S2, no murmurs, rubs, clicks or gallops          Labwork and Ancillary Studies:    CBC w/Diff  Lab Results   Component Value Date/Time    WBC 6.5 10/13/2015 12:57 PM    HGB 14.0 10/13/2015 12:57 PM    PLATELET 174 89/97/6065 12:57 PM         Basic Metabolic Profile  Lab Results   Component Value Date/Time    Sodium 140 10/21/2015 03:13 AM    Potassium 3.8 10/21/2015 03:13 AM    Chloride 106 10/21/2015 03:13 AM    CO2 26 10/21/2015 03:13 AM    Anion gap 6 09/28/2012 02:49 AM    Glucose 95 10/21/2015 03:13 AM    BUN 15 10/21/2015 03:13 AM    Creatinine 0.7 10/21/2015 03:13 AM    BUN/Creatinine ratio 12 09/28/2012 02:49 AM    GFR est AA >60.0 10/21/2015 03:13 AM    GFR est non-AA >60 10/21/2015 03:13 AM    Calcium 8.3 (L) 10/21/2015 03:13 AM         LFT  Lab Results   Component Value Date/Time    ALT (SGPT) 36 09/05/2012 01:44 PM    AST (SGOT) 18 09/05/2012 01:44 PM    Alk.  phosphatase 112 09/05/2012 01:44 PM    Bilirubin, total 0.4 09/05/2012 01:44 PM         Cholesterol  No results found for: CHOL, CHOLX, CHLST, CHOLV, HDL, HDLP, LDL, LDLC, DLDLP, TGLX, TRIGL, TRIGP, CHHD, CHHDX

## 2019-12-16 NOTE — PROGRESS NOTES
Kelli Dorado is a 78 y.o. female (: 1950) presenting to address:    Chief Complaint   Patient presents with    Hypertension    Dizziness     day room was spinning  . yesterday was off balance better by evening . Vitals:    19 1147   BP: 146/80   Pulse: 65   Resp: 16   Temp: 97.2 °F (36.2 °C)   TempSrc: Oral   SpO2: 99%   Weight: 133 lb (60.3 kg)   Height: 5' 3.5\" (1.613 m)   PainSc:   0 - No pain       Hearing/Vision:   No exam data present    Learning Assessment:     Learning Assessment 2019   PRIMARY LEARNER Patient   HIGHEST LEVEL OF EDUCATION - PRIMARY LEARNER  GRADUATED HIGH SCHOOL OR GED   BARRIERS PRIMARY LEARNER NONE   PRIMARY LANGUAGE ENGLISH   LEARNER PREFERENCE PRIMARY READING   ANSWERED BY patient    RELATIONSHIP SELF     Depression Screening:     3 most recent PHQ Screens 2019   Little interest or pleasure in doing things Not at all   Feeling down, depressed, irritable, or hopeless Not at all   Total Score PHQ 2 0     Fall Risk Assessment:     Fall Risk Assessment, last 12 mths 2019   Able to walk? Yes   Fall in past 12 months? Yes   Fall with injury? No   Number of falls in past 12 months 1   Fall Risk Score 1     Abuse Screening:     Abuse Screening Questionnaire 2019   Do you ever feel afraid of your partner? N   Are you in a relationship with someone who physically or mentally threatens you? N   Is it safe for you to go home? Y     Coordination of Care Questionaire:   1. Have you been to the ER, urgent care clinic since your last visit? Hospitalized since your last visit? NO    2. Have you seen or consulted any other health care providers outside of the [facility] since your last visit? Include any pap smears or colon screening. NO    Advanced Directive:   1. Do you have an Advanced Directive? Yes     2. Would you like information on Advanced Directives?  NO

## 2020-01-16 ENCOUNTER — OFFICE VISIT (OUTPATIENT)
Dept: FAMILY MEDICINE CLINIC | Age: 70
End: 2020-01-16

## 2020-01-16 VITALS
BODY MASS INDEX: 22.88 KG/M2 | HEIGHT: 64 IN | OXYGEN SATURATION: 96 % | HEART RATE: 65 BPM | WEIGHT: 134 LBS | TEMPERATURE: 97.1 F | SYSTOLIC BLOOD PRESSURE: 130 MMHG | DIASTOLIC BLOOD PRESSURE: 73 MMHG | RESPIRATION RATE: 16 BRPM

## 2020-01-16 DIAGNOSIS — I10 ESSENTIAL HYPERTENSION: Primary | ICD-10-CM

## 2020-01-16 DIAGNOSIS — Z85.820 HISTORY OF MELANOMA: ICD-10-CM

## 2020-01-16 DIAGNOSIS — K59.01 SLOW TRANSIT CONSTIPATION: ICD-10-CM

## 2020-01-16 DIAGNOSIS — L98.9 SKIN LESIONS: ICD-10-CM

## 2020-01-16 DIAGNOSIS — Z78.0 POSTMENOPAUSAL: ICD-10-CM

## 2020-01-16 RX ORDER — DOCUSATE SODIUM 100 MG/1
200 CAPSULE, LIQUID FILLED ORAL
Qty: 180 CAP | Refills: 1 | Status: SHIPPED | OUTPATIENT
Start: 2020-01-16 | End: 2020-06-25 | Stop reason: ALTCHOICE

## 2020-01-16 NOTE — PATIENT INSTRUCTIONS
High Blood Pressure: Care Instructions  Overview    It's normal for blood pressure to go up and down throughout the day. But if it stays up, you have high blood pressure. Another name for high blood pressure is hypertension. Despite what a lot of people think, high blood pressure usually doesn't cause headaches or make you feel dizzy or lightheaded. It usually has no symptoms. But it does increase your risk of stroke, heart attack, and other problems. You and your doctor will talk about your risks of these problems based on your blood pressure. Your doctor will give you a goal for your blood pressure. Your goal will be based on your health and your age. Lifestyle changes, such as eating healthy and being active, are always important to help lower blood pressure. You might also take medicine to reach your blood pressure goal.  Follow-up care is a key part of your treatment and safety. Be sure to make and go to all appointments, and call your doctor if you are having problems. It's also a good idea to know your test results and keep a list of the medicines you take. How can you care for yourself at home? Medical treatment  · If you stop taking your medicine, your blood pressure will go back up. You may take one or more types of medicine to lower your blood pressure. Be safe with medicines. Take your medicine exactly as prescribed. Call your doctor if you think you are having a problem with your medicine. · Talk to your doctor before you start taking aspirin every day. Aspirin can help certain people lower their risk of a heart attack or stroke. But taking aspirin isn't right for everyone, because it can cause serious bleeding. · See your doctor regularly. You may need to see the doctor more often at first or until your blood pressure comes down. · If you are taking blood pressure medicine, talk to your doctor before you take decongestants or anti-inflammatory medicine, such as ibuprofen.  Some of these medicines can raise blood pressure. · Learn how to check your blood pressure at home. Lifestyle changes  · Stay at a healthy weight. This is especially important if you put on weight around the waist. Losing even 10 pounds can help you lower your blood pressure. · If your doctor recommends it, get more exercise. Walking is a good choice. Bit by bit, increase the amount you walk every day. Try for at least 30 minutes on most days of the week. You also may want to swim, bike, or do other activities. · Avoid or limit alcohol. Talk to your doctor about whether you can drink any alcohol. · Try to limit how much sodium you eat to less than 2,300 milligrams (mg) a day. Your doctor may ask you to try to eat less than 1,500 mg a day. · Eat plenty of fruits (such as bananas and oranges), vegetables, legumes, whole grains, and low-fat dairy products. · Lower the amount of saturated fat in your diet. Saturated fat is found in animal products such as milk, cheese, and meat. Limiting these foods may help you lose weight and also lower your risk for heart disease. · Do not smoke. Smoking increases your risk for heart attack and stroke. If you need help quitting, talk to your doctor about stop-smoking programs and medicines. These can increase your chances of quitting for good. When should you call for help? Call  911 anytime you think you may need emergency care. This may mean having symptoms that suggest that your blood pressure is causing a serious heart or blood vessel problem. Your blood pressure may be over 180/120.   For example, call  911 if:    · You have symptoms of a heart attack. These may include:  ? Chest pain or pressure, or a strange feeling in the chest.  ? Sweating. ? Shortness of breath. ? Nausea or vomiting. ? Pain, pressure, or a strange feeling in the back, neck, jaw, or upper belly or in one or both shoulders or arms. ? Lightheadedness or sudden weakness.   ? A fast or irregular heartbeat.     · You have symptoms of a stroke. These may include:  ? Sudden numbness, tingling, weakness, or loss of movement in your face, arm, or leg, especially on only one side of your body. ? Sudden vision changes. ? Sudden trouble speaking. ? Sudden confusion or trouble understanding simple statements. ? Sudden problems with walking or balance. ? A sudden, severe headache that is different from past headaches.     · You have severe back or belly pain.    Do not wait until your blood pressure comes down on its own. Get help right away.   Call your doctor now or seek immediate care if:    · Your blood pressure is much higher than normal (such as 180/120 or higher), but you don't have symptoms.     · You think high blood pressure is causing symptoms, such as:  ? Severe headache.  ? Blurry vision.    Watch closely for changes in your health, and be sure to contact your doctor if:    · Your blood pressure measures higher than your doctor recommends at least 2 times. That means the top number is higher or the bottom number is higher, or both.     · You think you may be having side effects from your blood pressure medicine. Where can you learn more? Go to http://nirali-evert.info/. Enter R888 in the search box to learn more about \"High Blood Pressure: Care Instructions. \"  Current as of: April 9, 2019  Content Version: 12.2  © 4310-8169 [employer]. Care instructions adapted under license by [employer] (which disclaims liability or warranty for this information). If you have questions about a medical condition or this instruction, always ask your healthcare professional. [address] any warranty or liability for your use of this information.

## 2020-01-16 NOTE — PROGRESS NOTES
Assessment/Plan:    *Diagnoses and all orders for this visit:    1. Essential hypertension    2. Postmenopausal  -     DEXA BONE DENSITY STUDY AXIAL; Future    3. Slow transit constipation  -     docusate sodium (COLACE) 100 mg capsule; Take 2 Caps by mouth nightly as needed for Constipation. 4. History of melanoma  -     REFERRAL TO DERMATOLOGY    5. Skin lesions  -     REFERRAL TO DERMATOLOGY        The plan was discussed with the patient. The patient verbalized understanding and is in agreement with the plan. All medication potential side effects were discussed with the patient.    -------------------------------------------------------------------------------------------------------------------        Severa Fare is a 78 y.o. female and presents with Hypertension and Constipation         Subjective:  Pt here for f/u of her HTN. No adjustments to meds were made last time. Readings from home look great! 264-615 systolic, 47'B diastolic. Has some issues with constipation. Has tried a few OTC meds. Is asking for a stool softener. Has some skin lesions she wishes to have checked with Derm. Dur for a mammogram and DEXA soon. Review of Systems - ENT ROS: negative  Respiratory ROS: no cough, shortness of breath, or wheezing  Cardiovascular ROS: negative  Gastrointestinal ROS: no abdominal pain, change in bowel habits, or black or bloody stools         The problem list was updated as a part of today's visit.   Patient Active Problem List   Diagnosis Code    Mixed hyperlipidemia E78.2    Benign hypertension I10    Senile osteoporosis M81.0    Osteoarthrosis, unspecified whether generalized or localized, unspecified site M19.90    SOB (shortness of breath) R06.02    Upper respiratory tract infection J06.9    Osteopenia M85.80    Cutaneous malignant melanoma (HCC) C43.9    Dyspnea R06.00    Hypertension I10    Esophageal yeast infection ([facility] [address] B37.81    Visual disturbance H53.9    Difficulty hearing H91.90    Constipation K59.00    Diarrhea R19.7    Bone pain M89.8X9    Muscular weakness M62.81    Skin rash R21    Numbness R20.0    Excessive urinary volume R35.8    Heat intolerance R68.89    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 SH:  Social History     Tobacco Use    Smoking status: Never Smoker    Smokeless tobacco: Never Used   Substance Use Topics    Alcohol use: Yes     Comment: 1 monthly    Drug use: No       Medications/Allergies:  Current Outpatient Medications on File Prior to Visit   Medication Sig Dispense Refill    melatonin 3 mg tablet Take 3 mg by mouth nightly.  lisinopril (PRINIVIL, ZESTRIL) 10 mg tablet Take 1 Tab by mouth daily. 90 Tab 1    verapamil ER (CALAN-SR) 180 mg CR tablet       simvastatin (ZOCOR) 5 mg tablet TAKE 1 TABLET BY MOUTH ONCE DAILY AT BEDTIME      multivitamin (ONE A DAY) tablet Take 1 Tab by mouth daily.  ergocalciferol, vitamin D2, (VITAMIN D2 PO) Take  by mouth.  traZODone (DESYREL) 50 mg tablet Take 100 mg by mouth nightly. No current facility-administered medications on file prior to visit.          No Known Allergies      Health Maintenance:   Health Maintenance   Topic Date Due    Hepatitis C Screening  1950    Shingrix Vaccine Age 50> (1 of 2) 05/05/1999    FOBT Q 1 YEAR AGE 50-75  05/05/1999    GLAUCOMA SCREENING Q2Y  05/05/2014    Bone Densitometry (Dexa) Screening  05/05/2014    MEDICARE YEARLY EXAM  10/18/2018    BREAST CANCER SCRN MAMMOGRAM  12/31/2021    DTaP/Tdap/Td series (3 - Td) 08/20/2027    Influenza Age 5 to Adult  Completed    Pneumococcal 65+ years  Completed       Objective:  Visit Vitals  /73   Pulse 65   Temp 97.1 °F (36.2 °C) (Oral)   Resp 16   Ht 5' 3.5\" (1.613 m)   Wt 134 lb (60.8 kg)   SpO2 96%   BMI 23.36 kg/m²          Nurses notes and VS reviewed. Physical Examination: General appearance - alert, well appearing, and in no distress  Chest - clear to auscultation, no wheezes, rales or rhonchi, symmetric air entry  Heart - normal rate, regular rhythm, normal S1, S2, no murmurs, rubs, clicks or gallops          Labwork and Ancillary Studies:    CBC w/Diff  Lab Results   Component Value Date/Time    WBC 6.5 10/13/2015 12:57 PM    HGB 14.0 10/13/2015 12:57 PM    PLATELET 403 00/69/1502 12:57 PM         Basic Metabolic Profile  Lab Results   Component Value Date/Time    Sodium 140 10/21/2015 03:13 AM    Potassium 3.8 10/21/2015 03:13 AM    Chloride 106 10/21/2015 03:13 AM    CO2 26 10/21/2015 03:13 AM    Anion gap 6 09/28/2012 02:49 AM    Glucose 95 10/21/2015 03:13 AM    BUN 15 10/21/2015 03:13 AM    Creatinine 0.7 10/21/2015 03:13 AM    BUN/Creatinine ratio 12 09/28/2012 02:49 AM    GFR est AA >60.0 10/21/2015 03:13 AM    GFR est non-AA >60 10/21/2015 03:13 AM    Calcium 8.3 (L) 10/21/2015 03:13 AM         LFT  Lab Results   Component Value Date/Time    ALT (SGPT) 36 09/05/2012 01:44 PM    AST (SGOT) 18 09/05/2012 01:44 PM    Alk.  phosphatase 112 09/05/2012 01:44 PM    Bilirubin, total 0.4 09/05/2012 01:44 PM         Cholesterol  No results found for: CHOL, CHOLX, CHLST, CHOLV, HDL, HDLP, LDL, LDLC, DLDLP, TGLX, TRIGL, TRIGP, CHHD, CHHDX

## 2020-01-16 NOTE — PROGRESS NOTES
Truong Horton is a 78 y.o. female (: 1950) presenting to address:    Chief Complaint   Patient presents with    Hypertension    Constipation       Vitals:    20 1349   BP: 130/73   Pulse: 65   Resp: 16   Temp: 97.1 °F (36.2 °C)   TempSrc: Oral   SpO2: 96%   Weight: 134 lb (60.8 kg)   Height: 5' 3.5\" (1.613 m)   PainSc:   4   PainLoc: Foot       Hearing/Vision:   No exam data present    Learning Assessment:     Learning Assessment 2019   PRIMARY LEARNER Patient   HIGHEST LEVEL OF EDUCATION - PRIMARY LEARNER  GRADUATED HIGH SCHOOL OR GED   BARRIERS PRIMARY LEARNER NONE   PRIMARY LANGUAGE ENGLISH   LEARNER PREFERENCE PRIMARY READING   ANSWERED BY patient    RELATIONSHIP SELF     Depression Screening:     3 most recent PHQ Screens 2020   Little interest or pleasure in doing things Not at all   Feeling down, depressed, irritable, or hopeless Not at all   Total Score PHQ 2 0     Fall Risk Assessment:     Fall Risk Assessment, last 12 mths 2019   Able to walk? Yes   Fall in past 12 months? Yes   Fall with injury? No   Number of falls in past 12 months 1   Fall Risk Score 1     Abuse Screening:     Abuse Screening Questionnaire 2019   Do you ever feel afraid of your partner? N   Are you in a relationship with someone who physically or mentally threatens you? N   Is it safe for you to go home? Y     Coordination of Care Questionaire:   1. Have you been to the ER, urgent care clinic since your last visit? Hospitalized since your last visit? NO    2. Have you seen or consulted any other health care providers outside of the [address] [place] [address] since your last visit? Include any pap smears or colon screening. NO    Advanced Directive:   1. Do you have an Advanced Directive? NO    2. Would you like information on Advanced Directives?  NO

## 2020-03-05 NOTE — TELEPHONE ENCOUNTER
Requested Prescriptions     Pending Prescriptions Disp Refills    verapamil ER (CALAN-SR) 180 mg CR tablet

## 2020-03-09 NOTE — TELEPHONE ENCOUNTER
Medication is Historical . Last OV 1/6/2020   Future Appointments   Date Time Provider Merlin Brady   4/30/2020  1:30 PM MD Herbie Enamoradomarita   5/15/2020  1:15 PM MD Herbie En[facility]

## 2020-03-10 RX ORDER — VERAPAMIL HYDROCHLORIDE 180 MG/1
180 TABLET, EXTENDED RELEASE ORAL
Qty: 90 TAB | Refills: 1 | Status: SHIPPED | OUTPATIENT
Start: 2020-03-10 | End: 2020-05-26 | Stop reason: SDUPTHER

## 2020-03-12 RX ORDER — TRAZODONE HYDROCHLORIDE 50 MG/1
100 TABLET ORAL
Qty: 60 TAB | Refills: 5 | Status: SHIPPED | OUTPATIENT
Start: 2020-03-12 | End: 2021-01-06 | Stop reason: SDUPTHER

## 2020-03-12 NOTE — TELEPHONE ENCOUNTER
Trazodone last ordered by historical provider.  New patient with our office as of Nov. 2019    Last appt 01/16/2020  Next scheduled 04/30/2020

## 2020-03-12 NOTE — TELEPHONE ENCOUNTER
Requested Prescriptions     Pending Prescriptions Disp Refills    traZODone (DESYREL) 50 mg tablet       Sig: Take 2 Tabs by mouth nightly. Pt called to request a refill.      Future Appointments   Date Time Provider Merlin Harveyi   4/30/2020  1:30 PM Annette Nowak MD [facility]   5/15/2020  1:15 PM Annette Nowak MD [facility]

## 2020-04-06 RX ORDER — SIMVASTATIN 5 MG/1
TABLET, FILM COATED ORAL
Qty: 90 TAB | Refills: 1 | Status: SHIPPED | OUTPATIENT
Start: 2020-04-06 | End: 2020-10-21 | Stop reason: SDUPTHER

## 2020-04-06 NOTE — TELEPHONE ENCOUNTER
Pt called requesting medication refill. Pt states that she is out of medication. Please advise.     Requested Prescriptions     Pending Prescriptions Disp Refills    simvastatin (ZOCOR) 5 mg tablet

## 2020-04-06 NOTE — TELEPHONE ENCOUNTER
Simvastatin last ordered 01/23/19 by a historical provider. New patient with us as of 11/22/19.     Last appt 01/16/2020  Next scheduled 04/30/2020 and 05/15/2020

## 2020-05-26 NOTE — TELEPHONE ENCOUNTER
Requested Prescriptions     Pending Prescriptions Disp Refills    lisinopriL (PRINIVIL, ZESTRIL) 10 mg tablet 90 Tab 1     Sig: Take 1 Tab by mouth daily.  verapamil ER (CALAN-SR) 180 mg CR tablet 90 Tab 1     Sig: Take 1 Tab by mouth nightly.

## 2020-05-27 RX ORDER — VERAPAMIL HYDROCHLORIDE 180 MG/1
180 TABLET, EXTENDED RELEASE ORAL
Qty: 90 TAB | Refills: 0 | Status: SHIPPED | OUTPATIENT
Start: 2020-05-27 | End: 2020-08-19 | Stop reason: SDUPTHER

## 2020-05-27 RX ORDER — LISINOPRIL 10 MG/1
10 TABLET ORAL DAILY
Qty: 90 TAB | Refills: 0 | Status: SHIPPED | OUTPATIENT
Start: 2020-05-27 | End: 2020-08-05 | Stop reason: SDUPTHER

## 2020-05-27 NOTE — TELEPHONE ENCOUNTER
01/16/2020 last visit.    Future Appointments   Date Time Provider Merlin Brady   6/25/2020  2:30 PM Luz Hess MD [facility]

## 2020-06-25 ENCOUNTER — HOSPITAL ENCOUNTER (OUTPATIENT)
Dept: LAB | Age: 70
Discharge: HOME | End: 2020-06-25
Payer: MEDICARE

## 2020-06-25 ENCOUNTER — OFFICE VISIT (OUTPATIENT)
Dept: FAMILY MEDICINE CLINIC | Age: 70
End: 2020-06-25

## 2020-06-25 VITALS
TEMPERATURE: 96.8 F | DIASTOLIC BLOOD PRESSURE: 76 MMHG | RESPIRATION RATE: 16 BRPM | BODY MASS INDEX: 22.71 KG/M2 | WEIGHT: 133 LBS | SYSTOLIC BLOOD PRESSURE: 118 MMHG | HEART RATE: 77 BPM | HEIGHT: 64 IN | OXYGEN SATURATION: 99 %

## 2020-06-25 DIAGNOSIS — R35.0 URINARY FREQUENCY: ICD-10-CM

## 2020-06-25 DIAGNOSIS — N30.01 ACUTE CYSTITIS WITH HEMATURIA: Primary | ICD-10-CM

## 2020-06-25 DIAGNOSIS — R73.9 HYPERGLYCEMIA: ICD-10-CM

## 2020-06-25 LAB
BILIRUB UR QL STRIP: NEGATIVE
GLUCOSE UR-MCNC: NEGATIVE MG/DL
HBA1C MFR BLD HPLC: 5.2 %
KETONES P FAST UR STRIP-MCNC: NEGATIVE MG/DL
PH UR STRIP: 6 [PH] (ref 4.6–8)
PROT UR QL STRIP: NEGATIVE
SP GR UR STRIP: 1.02 (ref 1–1.03)
UA UROBILINOGEN AMB POC: NORMAL (ref 0.2–1)
URINALYSIS CLARITY POC: CLEAR
URINALYSIS COLOR POC: YELLOW
URINE BLOOD POC: NORMAL
URINE LEUKOCYTES POC: NEGATIVE
URINE NITRITES POC: NEGATIVE

## 2020-06-25 PROCEDURE — 87086 URINE CULTURE/COLONY COUNT: CPT

## 2020-06-25 NOTE — PROGRESS NOTES
Assessment/Plan:    *Diagnoses and all orders for this visit:    1. Acute cystitis with hematuria    2. Urinary frequency  -     AMB POC URINALYSIS DIP STICK AUTO W/O MICRO  -     CULTURE, URINE; Future    3. Hyperglycemia  -     AMB POC HEMOGLOBIN A1C      A1c 5.2%. Normal.    No Tx for the urine until her culture returns. If negative, will consider tx for overactive bladder. The plan was discussed with the patient. The patient verbalized understanding and is in agreement with the plan. All medication potential side effects were discussed with the patient.    -------------------------------------------------------------------------------------------------------------------        Sage Garcia is a 69 y.o. female and presents with Urinary Frequency; Urgency (2 to 3 days ago saw blood in urine . ); and Abdominal Pain (bloating )         Subjective:  Pt here for f/u. Has been doing well but having increased urinary frequency and noticed some blood 2 days ago. No fevers. Pt was also concerned about her sugars as recently, she had non fasting labs with an elevated glucose. She has been told in past years that she runs in the pre-diabetic range. Review of Systems - General ROS: negative         The problem list was updated as a part of today's visit.   Patient Active Problem List   Diagnosis Code    Mixed hyperlipidemia E78.2    Benign hypertension I10    Senile osteoporosis M81.0    Osteoarthrosis, unspecified whether generalized or localized, unspecified site M19.90    SOB (shortness of breath) R06.02    Upper respiratory tract infection J06.9    Osteopenia M85.80    Cutaneous malignant melanoma ([facility] [address] C43.9    Dyspnea R06.00    Hypertension I10    Esophageal yeast infection (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 SH:  Social History     Tobacco Use    Smoking status: Never Smoker    Smokeless tobacco: Never Used   Substance Use Topics    Alcohol use: Yes     Comment: 1 monthly    Drug use: No       Medications/Allergies:  Current Outpatient Medications on File Prior to Visit   Medication Sig Dispense Refill    lisinopriL (PRINIVIL, ZESTRIL) 10 mg tablet Take 1 Tab by mouth daily. 90 Tab 0    verapamil ER (CALAN-SR) 180 mg CR tablet Take 1 Tab by mouth nightly. 90 Tab 0    simvastatin (ZOCOR) 5 mg tablet TAKE 1 TABLET BY MOUTH ONCE DAILY AT BEDTIME 90 Tab 1    traZODone (DESYREL) 50 mg tablet Take 2 Tabs by mouth nightly as needed for Sleep. 60 Tab 5    melatonin 3 mg tablet Take 3 mg by mouth nightly.  ergocalciferol, vitamin D2, (VITAMIN D2 PO) Take  by mouth.  [DISCONTINUED] docusate sodium (COLACE) 100 mg capsule Take 2 Caps by mouth nightly as needed for Constipation. 180 Cap 1    [DISCONTINUED] multivitamin (ONE A DAY) tablet Take 1 Tab by mouth daily. No current facility-administered medications on file prior to visit.          No Known Allergies      Health Maintenance:   Health Maintenance   Topic Date Due    Hepatitis C Screening  1950    Lipid Screen  05/05/1959    Shingrix Vaccine Age 50> (1 of 2) 05/05/1999    FOBT Q1Y Age 50-75  05/05/1999    GLAUCOMA SCREENING Q2Y  05/05/2014    Bone Densitometry (Dexa) Screening  05/05/2014    Medicare Yearly Exam  10/18/2018    Influenza Age 5 to Adult  08/01/2020    Breast Cancer Screen Mammogram  01/06/2022    DTaP/Tdap/Td series (3 - Td) 08/20/2027    Pneumococcal 65+ years  Completed       Objective:  Visit Vitals  /76   Pulse 77   Temp 96.8 °F (36 °C) (Oral)   Resp 16   Ht 5' 3.5\" (1.613 m)   Wt 133 lb (60.3 kg)   SpO2 99%   BMI 23.19 kg/m²          Nurses notes and VS reviewed. Physical Examination: General appearance - alert, well appearing, and in no distress  Chest - clear to auscultation, no wheezes, rales or rhonchi, symmetric air entry  Heart - normal rate, regular rhythm, normal S1, S2, no murmurs, rubs, clicks or gallops  Abdomen - + TTP in the lower half of abd. Labwork and Ancillary Studies:    CBC w/Diff  Lab Results   Component Value Date/Time    WBC 6.5 10/13/2015 12:57 PM    HGB 14.0 10/13/2015 12:57 PM    PLATELET 942 66/83/1097 12:57 PM         Basic Metabolic Profile  Lab Results   Component Value Date/Time    Sodium 140 10/21/2015 03:13 AM    Potassium 3.8 10/21/2015 03:13 AM    Chloride 106 10/21/2015 03:13 AM    CO2 26 10/21/2015 03:13 AM    Anion gap 6 09/28/2012 02:49 AM    Glucose 95 10/21/2015 03:13 AM    BUN 15 10/21/2015 03:13 AM    Creatinine 0.7 10/21/2015 03:13 AM    BUN/Creatinine ratio 12 09/28/2012 02:49 AM    GFR est AA >60.0 10/21/2015 03:13 AM    GFR est non-AA >60 10/21/2015 03:13 AM    Calcium 8.3 (L) 10/21/2015 03:13 AM         LFT  Lab Results   Component Value Date/Time    ALT (SGPT) 36 09/05/2012 01:44 PM    Alk.  phosphatase 112 09/05/2012 01:44 PM    Bilirubin, total 0.4 09/05/2012 01:44 PM         Cholesterol  No results found for: CHOL, CHOLX, CHLST, CHOLV, HDL, HDLP, LDL, LDLC, DLDLP, TGLX, TRIGL, TRIGP, CHHD, CHHDX

## 2020-06-25 NOTE — PROGRESS NOTES
Herb Goldstein is a 69 y.o. female (: 1950) presenting to address:    Chief Complaint   Patient presents with    Urinary Frequency    Urgency     2 to 3 days ago saw blood in urine .  Abdominal Pain     bloating        Vitals:    20 1425   BP: 118/76   Pulse: 77   Resp: 16   Temp: 96.8 °F (36 °C)   TempSrc: Oral   SpO2: 99%   Weight: 133 lb (60.3 kg)   Height: 5' 3.5\" (1.613 m)   PainSc:   5   PainLoc: Abdomen       Hearing/Vision:   No exam data present    Learning Assessment:     Learning Assessment 2019   PRIMARY LEARNER Patient   HIGHEST LEVEL OF EDUCATION - PRIMARY LEARNER  GRADUATED HIGH SCHOOL OR GED   BARRIERS PRIMARY LEARNER NONE   PRIMARY LANGUAGE ENGLISH   LEARNER PREFERENCE PRIMARY READING   ANSWERED BY patient    RELATIONSHIP SELF     Depression Screening:     3 most recent PHQ Screens 2020   Little interest or pleasure in doing things Not at all   Feeling down, depressed, irritable, or hopeless Not at all   Total Score PHQ 2 0     Fall Risk Assessment:     Fall Risk Assessment, last 12 mths 2019   Able to walk? Yes   Fall in past 12 months? Yes   Fall with injury? No   Number of falls in past 12 months 1   Fall Risk Score 1     Abuse Screening:     Abuse Screening Questionnaire 2019   Do you ever feel afraid of your partner? N   Are you in a relationship with someone who physically or mentally threatens you? N   Is it safe for you to go home? Y     Coordination of Care Questionaire:   1. Have you been to the ER, urgent care clinic since your last visit? Hospitalized since your last visit? NO    2. Have you seen or consulted any other health care providers outside of the [address] [place] [address] since your last visit? Include any pap smears or colon screening. NO    Advanced Directive:   1. Do you have an Advanced Directive? NO    2. Would you like information on Advanced Directives?  NO

## 2020-06-27 LAB
BACTERIA SPEC CULT: NORMAL
CC UR VC: NORMAL
SERVICE CMNT-IMP: NORMAL

## 2020-06-30 ENCOUNTER — OFFICE VISIT (OUTPATIENT)
Dept: FAMILY MEDICINE CLINIC | Age: 70
End: 2020-06-30

## 2020-06-30 VITALS
TEMPERATURE: 97.8 F | HEART RATE: 68 BPM | HEIGHT: 64 IN | DIASTOLIC BLOOD PRESSURE: 77 MMHG | WEIGHT: 134 LBS | SYSTOLIC BLOOD PRESSURE: 137 MMHG | RESPIRATION RATE: 16 BRPM | BODY MASS INDEX: 22.88 KG/M2 | OXYGEN SATURATION: 98 %

## 2020-06-30 DIAGNOSIS — Z01.818 PREOP EXAMINATION: Primary | ICD-10-CM

## 2020-06-30 DIAGNOSIS — M25.552 LEFT HIP PAIN: ICD-10-CM

## 2020-06-30 DIAGNOSIS — I10 ESSENTIAL HYPERTENSION: ICD-10-CM

## 2020-06-30 NOTE — PROGRESS NOTES
Preoperative Evaluation    Date of Exam: 2020    Francena Ahumada is a 69 y.o. female (:1950) who presents for preoperative evaluation. Pt will be having a Left total hip arthroplasty with Dr. Carlene Waggoner, on 20 at [facility].    HTN:  Well controlled.       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 ([address] 2012    Visual disturbance 2012    Difficulty hearing 2012    Constipation 2012    Diarrhea 2012    Bone pain 2012    Muscular weakness 2012    Skin rash 2012    Numbness 2012    Excessive urinary volume 2012    Heat intolerance 2012    Osteoarthrosis, unspecified whether generalized or localized, unspecified site 2012    SOB (shortness of breath) 2012    Upper respiratory tract infection 2012    Osteopenia 2012    Cutaneous malignant melanoma ([address] 2012    Dyspnea 2012    Hypertension 2012    Esophageal yeast infection ([address] 2012    Mixed hyperlipidemia 2012    Benign hypertension 2012    Senile osteoporosis 2012     Medical History:     Past Medical History:   Diagnosis Date    Benign hypertension     Bone pain     Chronic pain     upper and mid back    Constipation     Cutaneous malignant melanoma ([address]     Diarrhea     Difficulty hearing     Dyspnea     Esophageal yeast infection ([address]     Excessive urinary volume     Heat intolerance     Hypertension     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  verapamil ER (CALAN-SR) 180 mg CR tablet Take 1 Tab by mouth nightly.  simvastatin (ZOCOR) 5 mg tablet TAKE 1 TABLET BY MOUTH ONCE DAILY AT BEDTIME    traZODone (DESYREL) 50 mg tablet Take 2 Tabs by mouth nightly as needed for Sleep. (Patient taking differently: Take 50 mg by mouth nightly as needed for Sleep.)    melatonin 3 mg tablet Take 3 mg by mouth nightly.  ergocalciferol, vitamin D2, (VITAMIN D2 PO) Take  by mouth. No current facility-administered medications for this visit. Surgical History:     Past Surgical History:   Procedure Laterality Date    HX HIP REPLACEMENT  9/2008    HX OTHER SURGICAL  12/05    Shoulder    HX OTHER SURGICAL      vein stripping    HX OTHER SURGICAL      mini face lift    HX OTHER SURGICAL      Hip revision    HX VEIN STRIPPING  1987    TOTAL HIP ARTHROPLASTY  2006    right     Social History:     Social History     Socioeconomic History    Marital status:      Spouse name: Not on file    Number of children: Not on file    Years of education: Not on file    Highest education level: Not on file   Tobacco Use    Smoking status: Never Smoker    Smokeless tobacco: Never Used   Substance and Sexual Activity    Alcohol use: Yes     Comment: 1 monthly    Drug use: No       Anesthesia Complications: None  History of abnormal bleeding : None  Health Care Directive or Living Will: yes    Objective:     Review of Systems   Musculoskeletal: Positive for joint pain (left hip). DIAGNOSTICS:   1. EKG: EKG FINDINGS - normal EKG, normal sinus rhythm    2.  Labs: within normal limits      Diagnoses and all orders for this visit:    Left hip pain    Preop examination    Essential hypertension        IMPRESSION:   Low risk for planned surgery  No contraindications to planned surgery    Rubi Amato MD   [address] [place] [facility]  [phone] (office #)  [phone] (fax #)  6/30/2020

## 2020-06-30 NOTE — PROGRESS NOTES
Tash Bustillo is a 69 y.o. female (: 1950) presenting to address:    Chief Complaint   Patient presents with    Pre-op Exam     total left hip replacement surgery         Vitals:    20 0941   BP: 137/77   Pulse: 68   Resp: 16   Temp: 97.8 °F (36.6 °C)   TempSrc: Oral   SpO2: 98%   Weight: 134 lb (60.8 kg)   Height: 5' 3.5\" (1.613 m)   PainSc:   5   PainLoc: Back       Hearing/Vision:   No exam data present    Learning Assessment:     Learning Assessment 2019   PRIMARY LEARNER Patient   HIGHEST LEVEL OF EDUCATION - PRIMARY LEARNER  GRADUATED HIGH SCHOOL OR GED   BARRIERS PRIMARY LEARNER NONE   PRIMARY LANGUAGE ENGLISH   LEARNER PREFERENCE PRIMARY READING   ANSWERED BY patient    RELATIONSHIP SELF     Depression Screening:     3 most recent PHQ Screens 2020   Little interest or pleasure in doing things Not at all   Feeling down, depressed, irritable, or hopeless Not at all   Total Score PHQ 2 0     Fall Risk Assessment:     Fall Risk Assessment, last 12 mths 2019   Able to walk? Yes   Fall in past 12 months? Yes   Fall with injury? No   Number of falls in past 12 months 1   Fall Risk Score 1     Abuse Screening:     Abuse Screening Questionnaire 2019   Do you ever feel afraid of your partner? N   Are you in a relationship with someone who physically or mentally threatens you? N   Is it safe for you to go home? Y     Coordination of Care Questionaire:   1. Have you been to the ER, urgent care clinic since your last visit? Hospitalized since your last visit? NO    2. Have you seen or consulted any other health care providers outside of the [address] [place] [address] since your last visit? Include any pap smears or colon screening. NO    Advanced Directive:   1. Do you have an Advanced Directive? Yes     2. Would you like information on Advanced Directives?  NO

## 2020-08-04 RX ORDER — LANOLIN ALCOHOL/MO/W.PET/CERES
3 CREAM (GRAM) TOPICAL
Status: CANCELLED | OUTPATIENT
Start: 2020-08-04

## 2020-08-04 NOTE — TELEPHONE ENCOUNTER
Requested Prescriptions     Pending Prescriptions Disp Refills    melatonin 3 mg tablet        Sig: Take 1 Tab by mouth nightly. Pt called to request a refill on her medication because she has 3 pills left. Please advise. No future appointments.

## 2020-08-05 RX ORDER — LISINOPRIL 10 MG/1
10 TABLET ORAL DAILY
Qty: 90 TAB | Refills: 2 | Status: SHIPPED | OUTPATIENT
Start: 2020-08-05 | End: 2021-03-19 | Stop reason: SDUPTHER

## 2020-08-05 NOTE — TELEPHONE ENCOUNTER
Pt called to check the status of her request because she now only has one pill left. Informed her that the message is pending. She is requesting a call whenever the medication gets sent over. Please advise. No future appointments.

## 2020-08-19 NOTE — TELEPHONE ENCOUNTER
Patient called requesting a refill on her medication. Requested Prescriptions     Pending Prescriptions Disp Refills    verapamil ER (CALAN-SR) 180 mg CR tablet 90 Tab 0     Sig: Take 1 Tab by mouth nightly. No future appointments.

## 2020-08-20 RX ORDER — VERAPAMIL HYDROCHLORIDE 180 MG/1
180 TABLET, EXTENDED RELEASE ORAL
Qty: 90 TAB | Refills: 0 | Status: SHIPPED | OUTPATIENT
Start: 2020-08-20 | End: 2020-12-18 | Stop reason: SDUPTHER

## 2020-09-29 ENCOUNTER — TELEPHONE (OUTPATIENT)
Dept: FAMILY MEDICINE CLINIC | Age: 70
End: 2020-09-29

## 2020-09-29 DIAGNOSIS — I10 ESSENTIAL HYPERTENSION: ICD-10-CM

## 2020-09-29 DIAGNOSIS — E55.9 HYPOVITAMINOSIS D: ICD-10-CM

## 2020-09-29 DIAGNOSIS — I10 ESSENTIAL HYPERTENSION: Primary | ICD-10-CM

## 2020-09-29 NOTE — TELEPHONE ENCOUNTER
Patient called to schedule a lab appointment for her [address] wellness exam.  Future Appointments   Date Time Provider Merlin Harveyi   10/16/2020  9:40 AM [facility]   10/22/2020 10:30 AM Shaheed Atkins MD [facility]     Patient needs lab orders placed for her. Patient is also stating that she usually gets tested for cobalt/chromium poisoning at her annual wellness due to an implant that she had several years ago.

## 2020-10-16 ENCOUNTER — APPOINTMENT (OUTPATIENT)
Dept: FAMILY MEDICINE CLINIC | Age: 70
End: 2020-10-16

## 2020-10-16 ENCOUNTER — HOSPITAL ENCOUNTER (OUTPATIENT)
Dept: LAB | Age: 70
Discharge: HOME | End: 2020-10-16
Payer: MEDICARE

## 2020-10-16 DIAGNOSIS — I10 ESSENTIAL HYPERTENSION: ICD-10-CM

## 2020-10-16 DIAGNOSIS — E55.9 HYPOVITAMINOSIS D: ICD-10-CM

## 2020-10-16 LAB
25(OH)D3 SERPL-MCNC: 24.1 NG/ML (ref 30–100)
ALBUMIN SERPL-MCNC: 3.8 G/DL (ref 3.4–5)
ALBUMIN/GLOB SERPL: 1.5 {RATIO} (ref 0.8–1.7)
ALP SERPL-CCNC: 92 U/L (ref 45–117)
ALT SERPL-CCNC: 19 U/L (ref 13–56)
ANION GAP SERPL CALC-SCNC: 2 MMOL/L (ref 3–18)
AST SERPL-CCNC: 20 U/L (ref 10–38)
BASOPHILS # BLD: 0 K/UL (ref 0–0.1)
BASOPHILS NFR BLD: 0 % (ref 0–2)
BILIRUB DIRECT SERPL-MCNC: 0.1 MG/DL (ref 0–0.2)
BILIRUB SERPL-MCNC: 0.3 MG/DL (ref 0.2–1)
BUN SERPL-MCNC: 12 MG/DL (ref 7–18)
BUN/CREAT SERPL: 19 (ref 12–20)
CALCIUM SERPL-MCNC: 8.8 MG/DL (ref 8.5–10.1)
CHLORIDE SERPL-SCNC: 110 MMOL/L (ref 100–111)
CHOLEST SERPL-MCNC: 139 MG/DL
CO2 SERPL-SCNC: 30 MMOL/L (ref 21–32)
CREAT SERPL-MCNC: 0.64 MG/DL (ref 0.6–1.3)
DIFFERENTIAL METHOD BLD: NORMAL
EOSINOPHIL # BLD: 0.1 K/UL (ref 0–0.4)
EOSINOPHIL NFR BLD: 1 % (ref 0–5)
ERYTHROCYTE [DISTWIDTH] IN BLOOD BY AUTOMATED COUNT: 13 % (ref 11.6–14.5)
GLOBULIN SER CALC-MCNC: 2.6 G/DL (ref 2–4)
GLUCOSE SERPL-MCNC: 86 MG/DL (ref 74–99)
HCT VFR BLD AUTO: 40.8 % (ref 35–45)
HDLC SERPL-MCNC: 48 MG/DL (ref 40–60)
HDLC SERPL: 2.9 {RATIO} (ref 0–5)
HGB BLD-MCNC: 13.1 G/DL (ref 12–16)
LDLC SERPL CALC-MCNC: 77 MG/DL (ref 0–100)
LIPID PROFILE,FLP: NORMAL
LYMPHOCYTES # BLD: 1.7 K/UL (ref 0.9–3.6)
LYMPHOCYTES NFR BLD: 33 % (ref 21–52)
MCH RBC QN AUTO: 28.9 PG (ref 24–34)
MCHC RBC AUTO-ENTMCNC: 32.1 G/DL (ref 31–37)
MCV RBC AUTO: 89.9 FL (ref 74–97)
MONOCYTES # BLD: 0.4 K/UL (ref 0.05–1.2)
MONOCYTES NFR BLD: 7 % (ref 3–10)
NEUTS SEG # BLD: 3 K/UL (ref 1.8–8)
NEUTS SEG NFR BLD: 59 % (ref 40–73)
PLATELET # BLD AUTO: 308 K/UL (ref 135–420)
PMV BLD AUTO: 9.3 FL (ref 9.2–11.8)
POTASSIUM SERPL-SCNC: 4.6 MMOL/L (ref 3.5–5.5)
PROT SERPL-MCNC: 6.4 G/DL (ref 6.4–8.2)
RBC # BLD AUTO: 4.54 M/UL (ref 4.2–5.3)
SODIUM SERPL-SCNC: 142 MMOL/L (ref 136–145)
T4 FREE SERPL-MCNC: 1 NG/DL (ref 0.7–1.5)
TRIGL SERPL-MCNC: 70 MG/DL (ref ?–150)
TSH SERPL DL<=0.05 MIU/L-ACNC: 1.29 UIU/ML (ref 0.36–3.74)
VLDLC SERPL CALC-MCNC: 14 MG/DL
WBC # BLD AUTO: 5.1 K/UL (ref 4.6–13.2)

## 2020-10-16 PROCEDURE — 80048 BASIC METABOLIC PNL TOTAL CA: CPT

## 2020-10-16 PROCEDURE — 85025 COMPLETE CBC W/AUTO DIFF WBC: CPT

## 2020-10-16 PROCEDURE — 84443 ASSAY THYROID STIM HORMONE: CPT

## 2020-10-16 PROCEDURE — 82306 VITAMIN D 25 HYDROXY: CPT

## 2020-10-16 PROCEDURE — 36415 COLL VENOUS BLD VENIPUNCTURE: CPT

## 2020-10-16 PROCEDURE — 84439 ASSAY OF FREE THYROXINE: CPT

## 2020-10-16 PROCEDURE — 80076 HEPATIC FUNCTION PANEL: CPT

## 2020-10-16 PROCEDURE — 82495 ASSAY OF CHROMIUM: CPT

## 2020-10-16 PROCEDURE — 83018 HEAVY METAL QUAN EACH NES: CPT

## 2020-10-16 PROCEDURE — 80061 LIPID PANEL: CPT

## 2020-10-19 LAB
COBALT SERPL-MCNC: <1 UG/L (ref 0–0.9)
CR SERPL-MCNC: 3.7 UG/L (ref 0.1–2.1)

## 2020-10-21 NOTE — TELEPHONE ENCOUNTER
Requested Prescriptions     Pending Prescriptions Disp Refills    simvastatin (ZOCOR) 5 mg tablet 90 Tab 1     Sig: TAKE 1 TABLET BY MOUTH ONCE DAILY AT BEDTIME     Pt called to request a refill because she is almost out. Please advise.      Future Appointments   Date Time Provider Merlin Brady   10/22/2020 10:30 AM MD STAN Parr [facility]

## 2020-10-22 ENCOUNTER — TELEPHONE (OUTPATIENT)
Dept: FAMILY MEDICINE CLINIC | Age: 70
End: 2020-10-22

## 2020-10-22 ENCOUNTER — OFFICE VISIT (OUTPATIENT)
Dept: FAMILY MEDICINE CLINIC | Age: 70
End: 2020-10-22
Payer: MEDICARE

## 2020-10-22 VITALS
BODY MASS INDEX: 22.36 KG/M2 | DIASTOLIC BLOOD PRESSURE: 73 MMHG | OXYGEN SATURATION: 97 % | TEMPERATURE: 97 F | SYSTOLIC BLOOD PRESSURE: 115 MMHG | WEIGHT: 131 LBS | HEIGHT: 64 IN | HEART RATE: 64 BPM | RESPIRATION RATE: 16 BRPM

## 2020-10-22 DIAGNOSIS — I83.813 VARICOSE VEINS OF BOTH LOWER EXTREMITIES WITH PAIN: ICD-10-CM

## 2020-10-22 DIAGNOSIS — E78.2 MIXED HYPERLIPIDEMIA: ICD-10-CM

## 2020-10-22 DIAGNOSIS — Z00.00 MEDICARE ANNUAL WELLNESS VISIT, SUBSEQUENT: ICD-10-CM

## 2020-10-22 DIAGNOSIS — I10 ESSENTIAL HYPERTENSION: Primary | ICD-10-CM

## 2020-10-22 DIAGNOSIS — N32.81 OVERACTIVE BLADDER: ICD-10-CM

## 2020-10-22 PROCEDURE — G0439 PPPS, SUBSEQ VISIT: HCPCS | Performed by: INTERNAL MEDICINE

## 2020-10-22 PROCEDURE — G8427 DOCREV CUR MEDS BY ELIG CLIN: HCPCS | Performed by: INTERNAL MEDICINE

## 2020-10-22 PROCEDURE — G0444 DEPRESSION SCREEN ANNUAL: HCPCS | Performed by: INTERNAL MEDICINE

## 2020-10-22 PROCEDURE — 1101F PT FALLS ASSESS-DOCD LE1/YR: CPT | Performed by: INTERNAL MEDICINE

## 2020-10-22 PROCEDURE — G8754 DIAS BP LESS 90: HCPCS | Performed by: INTERNAL MEDICINE

## 2020-10-22 PROCEDURE — G8536 NO DOC ELDER MAL SCRN: HCPCS | Performed by: INTERNAL MEDICINE

## 2020-10-22 PROCEDURE — 99214 OFFICE O/P EST MOD 30 MIN: CPT | Performed by: INTERNAL MEDICINE

## 2020-10-22 PROCEDURE — 3017F COLORECTAL CA SCREEN DOC REV: CPT | Performed by: INTERNAL MEDICINE

## 2020-10-22 PROCEDURE — G8420 CALC BMI NORM PARAMETERS: HCPCS | Performed by: INTERNAL MEDICINE

## 2020-10-22 PROCEDURE — G9899 SCRN MAM PERF RSLTS DOC: HCPCS | Performed by: INTERNAL MEDICINE

## 2020-10-22 PROCEDURE — G8752 SYS BP LESS 140: HCPCS | Performed by: INTERNAL MEDICINE

## 2020-10-22 PROCEDURE — 1090F PRES/ABSN URINE INCON ASSESS: CPT | Performed by: INTERNAL MEDICINE

## 2020-10-22 PROCEDURE — G0463 HOSPITAL OUTPT CLINIC VISIT: HCPCS | Performed by: INTERNAL MEDICINE

## 2020-10-22 PROCEDURE — G8510 SCR DEP NEG, NO PLAN REQD: HCPCS | Performed by: INTERNAL MEDICINE

## 2020-10-22 RX ORDER — OXYBUTYNIN CHLORIDE 10 MG/1
10 TABLET, EXTENDED RELEASE ORAL DAILY
Qty: 30 TAB | Refills: 1 | Status: SHIPPED | OUTPATIENT
Start: 2020-10-22 | End: 2020-11-25 | Stop reason: SDUPTHER

## 2020-10-22 RX ORDER — TOLTERODINE 4 MG/1
4 CAPSULE, EXTENDED RELEASE ORAL DAILY
Qty: 30 CAP | Refills: 0 | Status: SHIPPED | OUTPATIENT
Start: 2020-10-22 | End: 2020-10-22

## 2020-10-22 RX ORDER — SIMVASTATIN 5 MG/1
TABLET, FILM COATED ORAL
Qty: 90 TAB | Refills: 1 | Status: SHIPPED | OUTPATIENT
Start: 2020-10-22 | End: 2021-01-18 | Stop reason: SDUPTHER

## 2020-10-22 NOTE — PATIENT INSTRUCTIONS
Medicare Wellness Visit, Female The best way to live healthy is to have a lifestyle where you eat a well-balanced diet, exercise regularly, limit alcohol use, and quit all forms of tobacco/nicotine, if applicable. Regular preventive services are another way to keep healthy. Preventive services (vaccines, screening tests, monitoring & exams) can help personalize your care plan, which helps you manage your own care. Screening tests can find health problems at the earliest stages, when they are easiest to treat. [facility] follows the current, evidence-based guidelines published by the [facility] ([facility]) when recommending preventive services for our patients. Because we follow these guidelines, sometimes recommendations change over time as research supports it. (For example, mammograms used to be recommended annually. Even though Medicare will still pay for an annual mammogram, the newer guidelines recommend a mammogram every two years for women of average risk). Of course, you and your doctor may decide to screen more often for some diseases, based on your risk and your co-morbidities (chronic disease you are already diagnosed with). Preventive services for you include: - Medicare offers their members a free annual wellness visit, which is time for you and your primary care provider to discuss and plan for your preventive service needs. Take advantage of this benefit every year! 
-All adults over the age of 72 should receive the recommended pneumonia vaccines. Current USPSTF guidelines recommend a series of two vaccines for the best pneumonia protection.  
-All adults should have a flu vaccine yearly and a tetanus vaccine every 10 years.  
-All adults age 48 and older should receive the shingles vaccines (series of two vaccines). -All adults age 38-68 who are overweight should have a diabetes screening test once every three years. -All adults born between [birth year] and 1965 should be screened once for Hepatitis C. 
-Other screening tests and preventive services for persons with diabetes include: an eye exam to screen for diabetic retinopathy, a kidney function test, a foot exam, and stricter control over your cholesterol.  
-Cardiovascular screening for adults with routine risk involves an electrocardiogram (ECG) at intervals determined by your doctor.  
-Colorectal cancer screenings should be done for adults age 54-65 with no increased risk factors for colorectal cancer. There are a number of acceptable methods of screening for this type of cancer. Each test has its own benefits and drawbacks. Discuss with your doctor what is most appropriate for you during your annual wellness visit. The different tests include: colonoscopy (considered the best screening method), a fecal occult blood test, a fecal DNA test, and sigmoidoscopy. 
 
-A bone mass density test is recommended when a woman turns 65 to screen for osteoporosis. This test is only recommended one time, as a screening. Some providers will use this same test as a disease monitoring tool if you already have osteoporosis. -Breast cancer screenings are recommended every other year for women of normal risk, age 54-69. 
-Cervical cancer screenings for women over age 72 are only recommended with certain risk factors. Here is a list of your current Health Maintenance items (your personalized list of preventive services) with a due date: 
Health Maintenance Due Topic Date Due  
 Hepatitis C Test  1950  Shingles Vaccine (1 of 2) 05/05/1999  Colorectal Screening  05/05/1999  Glaucoma Screening   05/05/2014  Bone Mineral Density   05/05/2014 [address] [place] [address] Annual Well Visit  10/18/2018

## 2020-10-22 NOTE — PROGRESS NOTES
Assessment/Plan:    *Diagnoses and all orders for this visit:    1. Essential hypertension    2. Mixed hyperlipidemia    3. Varicose veins of both lower extremities with pain  -     REFERRAL TO VASCULAR SURGERY    4. Overactive bladder  -     tolterodine ER (DETROL LA) 4 mg ER capsule; Take 1 Cap by mouth daily. 5. Medicare annual wellness visit, subsequent        The plan was discussed with the patient. The patient verbalized understanding and is in agreement with the plan. All medication potential side effects were discussed with the patient.    -------------------------------------------------------------------------------------------------------------------        Tomasz Hong is a 69 y.o. female and presents with Annual Wellness Visit         Subjective:  Pt seen for f/u. Has been well. HTN:  Well controlled. Has a terrible time with her overactive bladder and would like to try something. HLD:  Controlled. Has issue with varicose veins in both legs and would like to see a specialist.  Has pain at times in her legs, veins are quite prominent. Review of Systems - General ROS: negative         The problem list was updated as a part of today's visit.   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 SH:  Social History     Tobacco Use    Smoking status: Never Smoker    Smokeless tobacco: Never Used   Substance Use Topics    Alcohol use: Yes     Comment: 1 monthly    Drug use: No       Medications/Allergies:  Current Outpatient Medications on File Prior to Visit   Medication Sig Dispense Refill    verapamil ER (CALAN-SR) 180 mg CR tablet Take 1 Tab by mouth nightly. 90 Tab 0    lisinopriL (PRINIVIL, ZESTRIL) 10 mg tablet Take 1 Tab by mouth daily. 90 Tab 2    traZODone (DESYREL) 50 mg tablet Take 2 Tabs by mouth nightly as needed for Sleep. (Patient taking differently: Take 50 mg by mouth nightly as needed for Sleep.) 60 Tab 5    melatonin 3 mg tablet Take 3 mg by mouth nightly.  ergocalciferol, vitamin D2, (VITAMIN D2 PO) Take  by mouth daily.  simvastatin (ZOCOR) 5 mg tablet TAKE 1 TABLET BY MOUTH ONCE DAILY AT BEDTIME 90 Tab 1     No current facility-administered medications on file prior to visit. No Known Allergies      Health Maintenance:   Health Maintenance   Topic Date Due    Hepatitis C Screening  1950    Shingrix Vaccine Age 50> (1 of 2) 05/05/1999    Colorectal Cancer Screening Combo  05/05/1999    GLAUCOMA SCREENING Q2Y  05/05/2014    Bone Densitometry (Dexa) Screening  05/05/2014    Medicare Yearly Exam  10/18/2018    Lipid Screen  10/16/2021    Breast Cancer Screen Mammogram  01/06/2022    DTaP/Tdap/Td series (3 - Td) 08/20/2027    Flu Vaccine  Completed    Pneumococcal 65+ years  Completed       Objective:  Visit Vitals  /73   Pulse 64   Temp 97 °F (36.1 °C) (Temporal)   Resp 16   Ht 5' 3.5\" (1.613 m)   Wt 131 lb (59.4 kg)   SpO2 97%   BMI 22.84 kg/m²          Nurses notes and VS reviewed.       Physical Examination: General appearance - alert, well appearing, and in no distress  Ears - bilateral TM's and external ear canals normal  Chest - clear to auscultation, no wheezes, rales or rhonchi, symmetric air entry  Heart - normal rate, regular rhythm, normal S1, S2, no murmurs, rubs, clicks or gallops  Abdomen - soft, nontender, nondistended, no masses or organomegaly  Musculoskeletal - no joint tenderness, deformity or swelling  Extremities - peripheral pulses normal, no pedal edema, no clubbing or cyanosis          Labwork and Ancillary Studies:    CBC w/Diff  Lab Results   Component Value Date/Time    WBC 5.1 10/16/2020 09:49 AM    HGB 13.1 10/16/2020 09:49 AM    PLATELET 322 96/61/2012 09:49 AM         Basic Metabolic Profile  Lab Results   Component Value Date/Time    Sodium 142 10/16/2020 09:49 AM    Potassium 4.6 10/16/2020 09:49 AM    Chloride 110 10/16/2020 09:49 AM    CO2 30 10/16/2020 09:49 AM    Anion gap 2 (L) 10/16/2020 09:49 AM    Glucose 86 10/16/2020 09:49 AM    BUN 12 10/16/2020 09:49 AM    Creatinine 0.64 10/16/2020 09:49 AM    BUN/Creatinine ratio 19 10/16/2020 09:49 AM    GFR est AA >60 10/16/2020 09:49 AM    GFR est non-AA >60 10/16/2020 09:49 AM    Calcium 8.8 10/16/2020 09:49 AM         LFT  Lab Results   Component Value Date/Time    ALT (SGPT) 19 10/16/2020 09:49 AM    Alk.  phosphatase 92 10/16/2020 09:49 AM    Bilirubin, direct 0.1 10/16/2020 09:49 AM    Bilirubin, total 0.3 10/16/2020 09:49 AM         Cholesterol  Lab Results   Component Value Date/Time    Cholesterol, total 139 10/16/2020 09:49 AM    HDL Cholesterol 48 10/16/2020 09:49 AM    LDL, calculated 77 10/16/2020 09:49 AM    Triglyceride 70 10/16/2020 09:49 AM    CHOL/HDL Ratio 2.9 10/16/2020 09:49 AM

## 2020-10-22 NOTE — TELEPHONE ENCOUNTER
Last refilled 4/6/20 for 90 with 1 . Last OV 6/30/20.    Future Appointments   Date Time Provider Merlin Harveyi   10/22/2020 10:30 AM Sol Rivera MD [facility]

## 2020-10-22 NOTE — PROGRESS NOTES
This is the Subsequent Medicare Annual Wellness Exam, performed 12 months or more after the Initial AWV or the last Subsequent AWV    I have reviewed the patient's medical history in detail and updated the computerized patient record.      History     Patient Active Problem List   Diagnosis Code    Mixed hyperlipidemia E78.2    Benign hypertension I10    Senile osteoporosis M81.0    Osteoarthrosis, unspecified whether generalized or localized, unspecified site M19.90    SOB (shortness of breath) R06.02    Upper respiratory tract infection J06.9    Osteopenia M85.80    Cutaneous malignant melanoma ([address] C43.9    Dyspnea R06.00    Hypertension I10    Esophageal yeast infection ([address] B37.81    Visual disturbance H53.9    Difficulty hearing H91.90    Constipation K59.00    Diarrhea R19.7    Bone pain M89.8X9    Muscular weakness M62.81    Skin rash R21    Numbness R20.0    Excessive urinary volume R35.8    Heat intolerance R68.89    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    GERD (gastroesophageal reflux disease) K21.9     Past Medical History:   Diagnosis Date    Benign hypertension     Bone pain     Chronic pain     upper and mid back    Constipation     Cutaneous malignant melanoma (HCC)     Diarrhea     Difficulty hearing     Dyspnea     Esophageal yeast infection ([address] 2011    Excessive urinary volume     Heat intolerance     Hypertension     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 ([address] 9/26/2012    Mixed hyperlipidemia     Muscular weakness     Nausea & vomiting     Numbness     Osteoarthrosis, unspecified whether generalized or localized, unspecified site     Osteopenia     Other ill-defined conditions(799.89)     microscopic hematuria    Senile osteoporosis     Skin rash     SOB (shortness of breath)     Upper respiratory tract infection     Visual disturbance       Past Surgical History:   Procedure Laterality Date    HX HIP REPLACEMENT  9/2008    HX OTHER SURGICAL  12/05    Shoulder    HX OTHER SURGICAL      vein stripping    HX OTHER SURGICAL      mini face lift    HX OTHER SURGICAL      Hip revision    HX VEIN STRIPPING  1987    TOTAL HIP ARTHROPLASTY  2006    right     Current Outpatient Medications   Medication Sig Dispense Refill    tolterodine ER (DETROL LA) 4 mg ER capsule Take 1 Cap by mouth daily. 30 Cap 0    verapamil ER (CALAN-SR) 180 mg CR tablet Take 1 Tab by mouth nightly. 90 Tab 0    lisinopriL (PRINIVIL, ZESTRIL) 10 mg tablet Take 1 Tab by mouth daily. 90 Tab 2    traZODone (DESYREL) 50 mg tablet Take 2 Tabs by mouth nightly as needed for Sleep. (Patient taking differently: Take 50 mg by mouth nightly as needed for Sleep.) 60 Tab 5    melatonin 3 mg tablet Take 3 mg by mouth nightly.  ergocalciferol, vitamin D2, (VITAMIN D2 PO) Take  by mouth daily.  simvastatin (ZOCOR) 5 mg tablet TAKE 1 TABLET BY MOUTH ONCE DAILY AT BEDTIME 90 Tab 1     No Known Allergies    Family History   Problem Relation Age of Onset    Hypertension Mother     Stroke Father     Arthritis-rheumatoid Sister      Social History     Tobacco Use    Smoking status: Never Smoker    Smokeless tobacco: Never Used   Substance Use Topics    Alcohol use: Yes     Comment: 1 monthly       Depression Risk Factor Screening:     3 most recent PHQ Screens 10/22/2020   Little interest or pleasure in doing things Not at all   Feeling down, depressed, irritable, or hopeless Not at all   Total Score PHQ 2 0       Alcohol Risk Screen   Do you average more than 1 drink per night or more than 7 drinks a week:  No    On any one occasion in the past three months have you have had more than 3 drinks containing alcohol:  No        Functional Ability and Level of Safety:   Hearing: Hearing is good. Activities of Daily Living: The home contains: no safety equipment.   Patient does total self care     Ambulation: with no difficulty     Fall Risk:  Fall Risk Assessment, last 12 mths 10/22/2020   Able to walk? Yes   Fall in past 12 months? No   Fall with injury? -   Number of falls in past 12 months -   Fall Risk Score -     Abuse Screen:  Patient is not abused       Cognitive Screening   Has your family/caregiver stated any concerns about your memory: no         Patient Care Team   Patient Care Team:  Des Hargrove MD as PCP - General (Internal Medicine)  Des Hargrove MD as PCP - [facility] Empaneled Provider    Assessment/Plan   Education and counseling provided:  Are appropriate based on today's review and evaluation    Diagnoses and all orders for this visit:    1. Medicare annual wellness visit, subsequent    2. Essential hypertension    3. Mixed hyperlipidemia    4. Hyperglycemia    5. Varicose veins of both lower extremities with pain  -     REFERRAL TO VASCULAR SURGERY    6. Overactive bladder  -     tolterodine ER (DETROL LA) 4 mg ER capsule; Take 1 Cap by mouth daily.         Health Maintenance Due   Topic Date Due    Hepatitis C Screening  1950    Shingrix Vaccine Age 50> (1 of 2) 05/05/1999    Colorectal Cancer Screening Combo  05/05/1999    GLAUCOMA SCREENING Q2Y  05/05/2014    Bone Densitometry (Dexa) Screening  05/05/2014    Medicare Yearly Exam  10/18/2018

## 2020-10-22 NOTE — PROGRESS NOTES
Nino Drew is a 69 y.o. female (: 1950) presenting to address:    Chief Complaint   Patient presents with    Annual Wellness Visit       Vitals:    10/22/20 1032   BP: 115/73   Pulse: 64   Resp: 16   Temp: 97 °F (36.1 °C)   TempSrc: Temporal   SpO2: 97%   Weight: 131 lb (59.4 kg)   Height: 5' 3.5\" (1.613 m)   PainSc:   6   PainLoc: Generalized       Hearing/Vision:      Hearing Screening    125Hz 250Hz 500Hz 1000Hz 2000Hz 3000Hz 4000Hz 6000Hz 8000Hz   Right ear:            Left ear:               Visual Acuity Screening    Right eye Left eye Both eyes   Without correction: 20/25 20/50 20/20   With correction:          Learning Assessment:     Learning Assessment 2019   PRIMARY LEARNER Patient   HIGHEST LEVEL OF EDUCATION - PRIMARY LEARNER  GRADUATED HIGH SCHOOL OR GED   BARRIERS PRIMARY LEARNER NONE   PRIMARY LANGUAGE ENGLISH   LEARNER PREFERENCE PRIMARY READING   ANSWERED BY patient    RELATIONSHIP SELF     Depression Screening:     3 most recent PHQ Screens 10/22/2020   Little interest or pleasure in doing things Not at all   Feeling down, depressed, irritable, or hopeless Not at all   Total Score PHQ 2 0     Fall Risk Assessment:     Fall Risk Assessment, last 12 mths 10/22/2020   Able to walk? Yes   Fall in past 12 months? No   Fall with injury? -   Number of falls in past 12 months -   Fall Risk Score -     Abuse Screening:     Abuse Screening Questionnaire 2019   Do you ever feel afraid of your partner? N   Are you in a relationship with someone who physically or mentally threatens you? N   Is it safe for you to go home? Y     Coordination of Care Questionaire:   1. Have you been to the ER, urgent care clinic since your last visit? Hospitalized since your last visit? NO    2. Have you seen or consulted any other health care providers outside of the [address] [place] [address] since your last visit? Include any pap smears or colon screening. NO    Advanced Directive:   1.  Do you have an Advanced Directive? NO    2. Would you like information on Advanced Directives?  NO

## 2020-10-22 NOTE — TELEPHONE ENCOUNTER
Pt is calling regarding the following medication tolterodine ER pt stated her insurance will not cover the medication it cost 140.00 and she would like to know if there is an alternative medication she can take

## 2020-11-25 RX ORDER — OXYBUTYNIN CHLORIDE 10 MG/1
10 TABLET, EXTENDED RELEASE ORAL DAILY
Qty: 30 TAB | Refills: 4 | Status: SHIPPED | OUTPATIENT
Start: 2020-11-25 | End: 2020-12-01 | Stop reason: ALTCHOICE

## 2020-11-25 NOTE — TELEPHONE ENCOUNTER
Detrol was not covered and Oxybuytin was sent instead on 10/22 for 30 with 1 refills . Last OV 10/22/20 No future appointments.

## 2020-11-30 DIAGNOSIS — N32.81 OVERACTIVE BLADDER: ICD-10-CM

## 2020-11-30 NOTE — TELEPHONE ENCOUNTER
Pt is calling in regard to the following medication ditropan xl 10mg stated that the medication is helping the pt and would like to know if she can switch back to detrol la 4mg instead

## 2020-12-01 RX ORDER — TOLTERODINE 4 MG/1
4 CAPSULE, EXTENDED RELEASE ORAL DAILY
Qty: 90 CAP | Refills: 1 | Status: SHIPPED | OUTPATIENT
Start: 2020-12-01 | End: 2021-03-19 | Stop reason: SDUPTHER

## 2020-12-01 NOTE — TELEPHONE ENCOUNTER
Left message for patient to call office . Per message from 10/22 [facility] was 140.00 Copay is she okay with paying this or is the other medication not working and Dr Mitch Mcwilliams needs to change it to something else .

## 2020-12-01 NOTE — TELEPHONE ENCOUNTER
I spoke with patient and she has changed prescription plans and would like to go back on detrol it worked better and says the oxybutynin has given her a headache ever since she started taking it .

## 2020-12-18 NOTE — TELEPHONE ENCOUNTER
Patient called requesting medication refill, please advise    Requested Prescriptions     Pending Prescriptions Disp Refills    verapamil ER (CALAN-SR) 180 mg CR tablet 90 Tab 0     Sig: Take 1 Tab by mouth nightly.

## 2020-12-20 RX ORDER — VERAPAMIL HYDROCHLORIDE 180 MG/1
180 TABLET, EXTENDED RELEASE ORAL
Qty: 90 TAB | Refills: 0 | Status: SHIPPED | OUTPATIENT
Start: 2020-12-20 | End: 2020-12-21 | Stop reason: SDUPTHER

## 2020-12-21 NOTE — TELEPHONE ENCOUNTER
Disregard previous message pt would like the medication to be sent to [facility]

## 2020-12-21 NOTE — TELEPHONE ENCOUNTER
Pt called stating that her normal pharmacy doesn't have the Verapamil. She is req that it be resent to the [facility]. I have updated the pharmacy.

## 2020-12-25 RX ORDER — VERAPAMIL HYDROCHLORIDE 180 MG/1
180 TABLET, EXTENDED RELEASE ORAL
Qty: 90 TAB | Refills: 1 | Status: SHIPPED | OUTPATIENT
Start: 2020-12-25 | End: 2021-07-20 | Stop reason: SDUPTHER

## 2021-01-07 RX ORDER — TRAZODONE HYDROCHLORIDE 50 MG/1
100 TABLET ORAL
Qty: 60 TAB | Refills: 5 | Status: SHIPPED | OUTPATIENT
Start: 2021-01-07 | End: 2021-07-20 | Stop reason: DRUGHIGH

## 2021-01-18 RX ORDER — VERAPAMIL HYDROCHLORIDE 180 MG/1
180 TABLET, EXTENDED RELEASE ORAL
Qty: 90 TAB | Refills: 1 | Status: CANCELLED | OUTPATIENT
Start: 2021-01-18

## 2021-01-18 NOTE — TELEPHONE ENCOUNTER
Requested Prescriptions     Pending Prescriptions Disp Refills    verapamil ER (CALAN-SR) 180 mg CR tablet 90 Tab 1     Sig: Take 1 Tab by mouth nightly.     simvastatin (ZOCOR) 5 mg tablet 90 Tab 1     Sig: TAKE 1 TABLET BY MOUTH ONCE DAILY AT BEDTIME

## 2021-01-19 RX ORDER — SIMVASTATIN 5 MG/1
TABLET, FILM COATED ORAL
Qty: 90 TAB | Refills: 0 | Status: SHIPPED | OUTPATIENT
Start: 2021-01-19 | End: 2021-04-25

## 2021-01-19 NOTE — TELEPHONE ENCOUNTER
Please advise the patient that the requested medication has been refilled for 90 days. Now that Dr. Praveen Coon has left this practice the patient will need to either establish care with a new provider at this practice or schedule follow-up with Dr. Praveen Coon at her new practice location to obtain additional refills after this.

## 2021-01-19 NOTE — TELEPHONE ENCOUNTER
Verapamil was just sent 12/25/20 for 90 with . Zocor last refilled 10/22/20 for 90 with 1 sent to [facility] patient want it sent to [facility] .

## 2021-02-19 LAB — MAMMOGRAPHY, EXTERNAL: NORMAL

## 2021-02-23 NOTE — TELEPHONE ENCOUNTER
Left message for pt, Rx was approved and sent to pharmacy; pt needs to schedule w/new provider at this office or schedule f/u w/Zepeda at her new office.

## 2021-03-19 ENCOUNTER — HOSPITAL ENCOUNTER (OUTPATIENT)
Dept: LAB | Age: 71
Discharge: HOME | End: 2021-03-19
Payer: MEDICARE

## 2021-03-19 ENCOUNTER — APPOINTMENT (OUTPATIENT)
Dept: FAMILY MEDICINE CLINIC | Age: 71
End: 2021-03-19

## 2021-03-19 ENCOUNTER — OFFICE VISIT (OUTPATIENT)
Dept: FAMILY MEDICINE CLINIC | Age: 71
End: 2021-03-19
Payer: MEDICARE

## 2021-03-19 VITALS
WEIGHT: 143 LBS | DIASTOLIC BLOOD PRESSURE: 84 MMHG | HEART RATE: 70 BPM | SYSTOLIC BLOOD PRESSURE: 132 MMHG | OXYGEN SATURATION: 97 % | TEMPERATURE: 98.4 F | HEIGHT: 64 IN | RESPIRATION RATE: 16 BRPM | BODY MASS INDEX: 24.41 KG/M2

## 2021-03-19 DIAGNOSIS — E78.2 MIXED HYPERLIPIDEMIA: Primary | ICD-10-CM

## 2021-03-19 DIAGNOSIS — I10 ESSENTIAL HYPERTENSION: ICD-10-CM

## 2021-03-19 DIAGNOSIS — N32.81 OVERACTIVE BLADDER: ICD-10-CM

## 2021-03-19 DIAGNOSIS — G47.9 SLEEP DISTURBANCE: ICD-10-CM

## 2021-03-19 DIAGNOSIS — E78.2 MIXED HYPERLIPIDEMIA: ICD-10-CM

## 2021-03-19 DIAGNOSIS — C43.9 CUTANEOUS MALIGNANT MELANOMA (HCC): ICD-10-CM

## 2021-03-19 LAB
ALBUMIN SERPL-MCNC: 3.9 G/DL (ref 3.4–5)
ALBUMIN/GLOB SERPL: 1.3 {RATIO} (ref 0.8–1.7)
ALP SERPL-CCNC: 89 U/L (ref 45–117)
ALT SERPL-CCNC: 26 U/L (ref 13–56)
ANION GAP SERPL CALC-SCNC: 5 MMOL/L (ref 3–18)
AST SERPL-CCNC: 22 U/L (ref 10–38)
BILIRUB SERPL-MCNC: 0.2 MG/DL (ref 0.2–1)
BUN SERPL-MCNC: 13 MG/DL (ref 7–18)
BUN/CREAT SERPL: 25 (ref 12–20)
CALCIUM SERPL-MCNC: 9 MG/DL (ref 8.5–10.1)
CHLORIDE SERPL-SCNC: 106 MMOL/L (ref 100–111)
CO2 SERPL-SCNC: 29 MMOL/L (ref 21–32)
CREAT SERPL-MCNC: 0.52 MG/DL (ref 0.6–1.3)
GLOBULIN SER CALC-MCNC: 3.1 G/DL (ref 2–4)
GLUCOSE SERPL-MCNC: 94 MG/DL (ref 74–99)
POTASSIUM SERPL-SCNC: 4.8 MMOL/L (ref 3.5–5.5)
PROT SERPL-MCNC: 7 G/DL (ref 6.4–8.2)
SODIUM SERPL-SCNC: 140 MMOL/L (ref 136–145)

## 2021-03-19 PROCEDURE — 1101F PT FALLS ASSESS-DOCD LE1/YR: CPT | Performed by: LEGAL MEDICINE

## 2021-03-19 PROCEDURE — 1090F PRES/ABSN URINE INCON ASSESS: CPT | Performed by: LEGAL MEDICINE

## 2021-03-19 PROCEDURE — 3017F COLORECTAL CA SCREEN DOC REV: CPT | Performed by: LEGAL MEDICINE

## 2021-03-19 PROCEDURE — G8754 DIAS BP LESS 90: HCPCS | Performed by: LEGAL MEDICINE

## 2021-03-19 PROCEDURE — G8752 SYS BP LESS 140: HCPCS | Performed by: LEGAL MEDICINE

## 2021-03-19 PROCEDURE — G8420 CALC BMI NORM PARAMETERS: HCPCS | Performed by: LEGAL MEDICINE

## 2021-03-19 PROCEDURE — G0463 HOSPITAL OUTPT CLINIC VISIT: HCPCS | Performed by: LEGAL MEDICINE

## 2021-03-19 PROCEDURE — 99214 OFFICE O/P EST MOD 30 MIN: CPT | Performed by: LEGAL MEDICINE

## 2021-03-19 PROCEDURE — 36415 COLL VENOUS BLD VENIPUNCTURE: CPT

## 2021-03-19 PROCEDURE — G9899 SCRN MAM PERF RSLTS DOC: HCPCS | Performed by: LEGAL MEDICINE

## 2021-03-19 PROCEDURE — G8427 DOCREV CUR MEDS BY ELIG CLIN: HCPCS | Performed by: LEGAL MEDICINE

## 2021-03-19 PROCEDURE — G8510 SCR DEP NEG, NO PLAN REQD: HCPCS | Performed by: LEGAL MEDICINE

## 2021-03-19 PROCEDURE — G8536 NO DOC ELDER MAL SCRN: HCPCS | Performed by: LEGAL MEDICINE

## 2021-03-19 PROCEDURE — 80053 COMPREHEN METABOLIC PANEL: CPT

## 2021-03-19 RX ORDER — LISINOPRIL 10 MG/1
10 TABLET ORAL DAILY
Qty: 90 TAB | Refills: 2 | Status: SHIPPED | OUTPATIENT
Start: 2021-03-19 | End: 2021-05-19 | Stop reason: SDUPTHER

## 2021-03-19 RX ORDER — TOLTERODINE 4 MG/1
4 CAPSULE, EXTENDED RELEASE ORAL DAILY
Qty: 90 CAP | Refills: 1 | Status: SHIPPED | OUTPATIENT
Start: 2021-03-19 | End: 2021-07-09 | Stop reason: SDUPTHER

## 2021-03-19 RX ORDER — ACETAMINOPHEN 500 MG
1000 TABLET ORAL
COMMUNITY
Start: 2020-08-14

## 2021-03-19 NOTE — PROGRESS NOTES
Etienne Ruben     Chief Complaint   Patient presents with   [address]     Transition of Care     Vitals:    03/19/21 1137 03/19/21 1144   BP: (!) 143/86 132/84   Pulse: 70    Resp: 16    Temp: 98.4 °F (36.9 °C)    TempSrc: Temporal    SpO2: 97%    Weight: 143 lb (64.9 kg)    Height: 5' 3.5\" (1.613 m)          HPI:She is here to establish care     Planning to [facility] back [facility] in the near future     Has arthritis shoulders bilaterally     Also knee pain due to arthritis     colonscopy done in the last 5 years will give us doctor name to obtain record s    Past Medical History:   Diagnosis Date    Benign hypertension     Bone pain     Chronic pain     upper and mid back    Constipation     Cutaneous malignant melanoma ([address]     Diarrhea     Difficulty hearing     Dyspnea     Esophageal yeast infection ([address] 2011    Excessive urinary volume     Heat intolerance     Hypertension     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 ([address] 9/26/2012    Mixed hyperlipidemia     Muscular weakness     Nausea & vomiting     Numbness     Osteoarthrosis, unspecified whether generalized or localized, unspecified site     Osteopenia     Other ill-defined conditions(799.89)     microscopic hematuria    Senile osteoporosis     Skin rash     SOB (shortness of breath)     Upper respiratory tract infection     Visual disturbance      Past Surgical History:   Procedure Laterality Date    HX HIP REPLACEMENT  9/2008    HX OTHER SURGICAL  12/05    Shoulder    HX OTHER SURGICAL      vein stripping    HX OTHER SURGICAL      mini face lift    HX OTHER SURGICAL      Hip revision    HX VEIN STRIPPING  1987    [place] TOTAL HIP ARTHROPLASTY  2006    right     Social History     Tobacco Use    Smoking status: Never Smoker    Smokeless tobacco: Never Used   Substance Use Topics    Alcohol use: Yes     Comment: 1 monthly       Family History   Problem Relation Age of Onset    Hypertension Mother  Stroke Father    Jose Wing Arthritis-rheumatoid Sister        Review of Systems   Constitutional: Negative for chills, fever, malaise/fatigue and weight loss. HENT: Negative for congestion, ear discharge, ear pain, hearing loss, nosebleeds, sinus pain and sore throat. Eyes: Negative for blurred vision, double vision and discharge. Respiratory: Negative for cough, hemoptysis, sputum production, shortness of breath and wheezing. Cardiovascular: Negative for chest pain, palpitations, claudication and leg swelling. Gastrointestinal: Negative for abdominal pain, constipation, diarrhea, nausea and vomiting. Genitourinary: Positive for frequency. Negative for dysuria, flank pain, hematuria and urgency. Musculoskeletal: Positive for back pain, joint pain and neck pain. Negative for falls and myalgias. Skin: Negative for itching and rash. Neurological: Negative for dizziness, tingling, sensory change, speech change, focal weakness, seizures, weakness and headaches. Psychiatric/Behavioral: Negative for depression, hallucinations, memory loss, substance abuse and suicidal ideas. The patient has insomnia. The patient is not nervous/anxious. Physical Exam  Vitals signs and nursing note reviewed. Constitutional:       General: She is not in acute distress. Appearance: She is well-developed. She is not diaphoretic. HENT:      Head: Normocephalic and atraumatic. Eyes:      General: No scleral icterus. Right eye: No discharge. Left eye: No discharge. Conjunctiva/sclera: Conjunctivae normal.   Neck:      Thyroid: No thyromegaly. Cardiovascular:      Rate and Rhythm: Normal rate and regular rhythm. Heart sounds: Normal heart sounds. Pulmonary:      Effort: Pulmonary effort is normal. No respiratory distress. Breath sounds: Normal breath sounds. No wheezing or rales. Chest:      Chest wall: No tenderness. Abdominal:      General: There is no distension. Palpations: Abdomen is soft. Tenderness: There is no abdominal tenderness. There is no rebound. Musculoskeletal: Normal range of motion. General: Tenderness present. No deformity. Lymphadenopathy:      Cervical: No cervical adenopathy. Skin:     General: Skin is warm and dry. Coloration: Skin is not pale. Findings: No erythema or rash. Neurological:      Mental Status: She is alert and oriented to person, place, and time. Cranial Nerves: No cranial nerve deficit. Coordination: Coordination normal.   Psychiatric:         Behavior: Behavior normal.         Thought Content: Thought content normal.         Judgment: Judgment normal.          Assessment and plan     Plan of care has been discussed with the patient, he agrees to the plan and verbalized understanding. All his questions were answered  More than 50% of the time spent in this visit was counseling the patient about  illness and treatment options         1. Overactive bladder  Stable on current medication   - tolterodine ER (DETROL LA) 4 mg ER capsule; Take 1 Cap by mouth daily. Dispense: 90 Cap; Refill: 1    2. Cutaneous malignant melanoma ([facility]  Was treated and she is following up  Once a year with dermatology ( can not recall name      3. Mixed hyperlipidemia    - METABOLIC PANEL, COMPREHENSIVE; Future    4. Essential hypertension  Well controlled on  Current medication   - METABOLIC PANEL, COMPREHENSIVE; Future  - lisinopriL (PRINIVIL, ZESTRIL) 10 mg tablet; Take 1 Tab by mouth daily. Dispense: 90 Tab; Refill: 2    5. Sleep disturbance  She is sleeping with trazadone     Current Outpatient Medications   Medication Sig Dispense Refill    tolterodine ER (DETROL LA) 4 mg ER capsule Take 1 Cap by mouth daily. 90 Cap 1    acetaminophen (TYLENOL) 500 mg tablet Take 1,000 mg by mouth.  lisinopriL (PRINIVIL, ZESTRIL) 10 mg tablet Take 1 Tab by mouth daily.  90 Tab 2    simvastatin (ZOCOR) 5 mg tablet TAKE 1 TABLET BY MOUTH ONCE DAILY AT BEDTIME. 90 Tab 0    traZODone (DESYREL) 50 mg tablet Take 2 Tabs by mouth nightly as needed for Sleep. 60 Tab 5    verapamil ER (CALAN-SR) 180 mg CR tablet Take 1 Tab by mouth nightly. 90 Tab 1    melatonin 3 mg tablet Take 3 mg by mouth nightly.  ergocalciferol, vitamin D2, (VITAMIN D2 PO) Take  by mouth daily. 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 ([facility] 09/26/2012    Visual disturbance 09/11/2012    Difficulty hearing 09/11/2012    Constipation 09/11/2012    Diarrhea 09/11/2012    Bone pain 09/11/2012    Muscular weakness 09/11/2012    Skin rash 09/11/2012    Numbness 09/11/2012    Excessive urinary volume 09/11/2012    Heat intolerance 09/11/2012    Osteoarthrosis, unspecified whether generalized or localized, unspecified site 09/04/2012    SOB (shortness of breath) 09/04/2012    Upper respiratory tract infection 09/04/2012    Osteopenia 09/04/2012    Cutaneous malignant melanoma ([facility] 09/04/2012    Dyspnea 09/04/2012    Hypertension 09/04/2012    Es[facility] infection ([facility] 09/04/2012    Mixed hyperlipidemia 08/20/2012    Benign hypertension 08/20/2012    Senile osteoporosis 08/20/2012     Results for orders placed or performed during the hospital encounter of 10/16/20   CBC WITH AUTOMATED DIFF   Result Value Ref Range    WBC 5.1 4.6 - 13.2 K/uL    RBC 4.54 4.20 - 5.30 M/uL    HGB 13.1 12.0 - 16.0 g/dL    HCT 40.8 35.0 - 45.0 %    MCV 89.9 74.0 - 97.0 FL    MCH 28.9 24.0 - 34.0 PG    MCHC 32.1 31.0 - 37.0 g/dL    RDW 13.0 11.6 - 14.5 %    PLATELET 476 951 - 631 K/uL    MPV 9.3 9.2 - 11.8 FL    NEUTROPHILS 59 40 - 73 %    LYMPHOCYTES 33 21 - 52 %    MONOCYTES 7 3 - 10 %    EOSINOPHILS 1 0 - 5 %    BASOPHILS 0 0 - 2 %    ABS. NEUTROPHILS 3.0 1.8 - 8.0 K/UL    ABS. LYMPHOCYTES 1.7 0.9 - 3.6 K/UL    ABS.  MONOCYTES 0.4 0.05 - 1.2 K/UL    ABS. EOSINOPHILS 0.1 0.0 - 0.4 K/UL    ABS. BASOPHILS 0.0 0.0 - 0.1 K/UL    DF AUTOMATED     HEPATIC FUNCTION PANEL   Result Value Ref Range    Protein, total 6.4 6.4 - 8.2 g/dL    Albumin 3.8 3.4 - 5.0 g/dL    Globulin 2.6 2.0 - 4.0 g/dL    A-G Ratio 1.5 0.8 - 1.7      Bilirubin, total 0.3 0.2 - 1.0 MG/DL    Bilirubin, direct 0.1 0.0 - 0.2 MG/DL    Alk. phosphatase 92 45 - 117 U/L    AST (SGOT) 20 10 - 38 U/L    ALT (SGPT) 19 13 - 56 U/L   LIPID PANEL   Result Value Ref Range    LIPID PROFILE          Cholesterol, total 139 <200 MG/DL    Triglyceride 70 <150 MG/DL    HDL Cholesterol 48 40 - 60 MG/DL    LDL, calculated 77 0 - 100 MG/DL    VLDL, calculated 14 MG/DL    CHOL/HDL Ratio 2.9 0 - 5.0     METABOLIC PANEL, BASIC   Result Value Ref Range    Sodium 142 136 - 145 mmol/L    Potassium 4.6 3.5 - 5.5 mmol/L    Chloride 110 100 - 111 mmol/L    CO2 30 21 - 32 mmol/L    Anion gap 2 (L) 3.0 - 18 mmol/L    Glucose 86 74 - 99 mg/dL    BUN 12 7.0 - 18 MG/DL    Creatinine 0.64 0.6 - 1.3 MG/DL    BUN/Creatinine ratio 19 12 - 20      GFR est AA >60 >60 ml/min/1.73m2    GFR est non-AA >60 >60 ml/min/1.73m2    Calcium 8.8 8.5 - 10.1 MG/DL   TSH 3RD GENERATION   Result Value Ref Range    TSH 1.29 0.36 - 3.74 uIU/mL   T4, FREE   Result Value Ref Range    T4, Free 1.0 0.7 - 1.5 NG/DL   VITAMIN D, 25 HYDROXY   Result Value Ref Range    Vitamin D 25-Hydroxy 24.1 (L) 30 - 100 ng/mL   CHROMIUM   Result Value Ref Range    Chromium, Plasma 3.7 (H) 0.1 - 2.1 ug/L   COBALT, PLASMA   Result Value Ref Range    Cobalt, plasma <1.0 0.0 - 0.9 ug/L     No visits with results within 3 Month(s) from this visit.    Latest known visit with results is:   Hospital Outpatient Visit on 10/16/2020   Component Date Value Ref Range Status    WBC 10/16/2020 5.1  4.6 - 13.2 K/uL Final    RBC 10/16/2020 4.54  4.20 - 5.30 M/uL Final    HGB 10/16/2020 13.1  12.0 - 16.0 g/dL Final    HCT 10/16/2020 40.8  35.0 - 45.0 % Final    MCV 10/16/2020 89.9  74.0 - 97.0 FL Final    MCH 10/16/2020 28.9  24.0 - 34.0 PG Final    MCHC 10/16/2020 32.1  31.0 - 37.0 g/dL Final    RDW 10/16/2020 13.0  11.6 - 14.5 % Final    PLATELET 85/23/1156 884  135 - 420 K/uL Final    MPV 10/16/2020 9.3  9.2 - 11.8 FL Final    NEUTROPHILS 10/16/2020 59  40 - 73 % Final    LYMPHOCYTES 10/16/2020 33  21 - 52 % Final    MONOCYTES 10/16/2020 7  3 - 10 % Final    EOSINOPHILS 10/16/2020 1  0 - 5 % Final    BASOPHILS 10/16/2020 0  0 - 2 % Final    ABS. NEUTROPHILS 10/16/2020 3.0  1.8 - 8.0 K/UL Final    ABS. LYMPHOCYTES 10/16/2020 1.7  0.9 - 3.6 K/UL Final    ABS. MONOCYTES 10/16/2020 0.4  0.05 - 1.2 K/UL Final    ABS. EOSINOPHILS 10/16/2020 0.1  0.0 - 0.4 K/UL Final    ABS. BASOPHILS 10/16/2020 0.0  0.0 - 0.1 K/UL Final    DF 10/16/2020 AUTOMATED    Final    Protein, total 10/16/2020 6.4  6.4 - 8.2 g/dL Final    Albumin 10/16/2020 3.8  3.4 - 5.0 g/dL Final    Globulin 10/16/2020 2.6  2.0 - 4.0 g/dL Final    A-G Ratio 10/16/2020 1.5  0.8 - 1.7   Final    Bilirubin, total 10/16/2020 0.3  0.2 - 1.0 MG/DL Final    Bilirubin, direct 10/16/2020 0.1  0.0 - 0.2 MG/DL Final    Alk. phosphatase 10/16/2020 92  45 - 117 U/L Final    AST (SGOT) 10/16/2020 20  10 - 38 U/L Final    ALT (SGPT) 10/16/2020 19  13 - 56 U/L Final    LIPID PROFILE 10/16/2020        Final    Cholesterol, total 10/16/2020 139  <200 MG/DL Final    Triglyceride 10/16/2020 70  <150 MG/DL Final    Comment: The drugs N-acetylcysteine (NAC) and  Metamiszole have been found to cause falsely  low results in this chemical assay. Please  be sure to submit blood samples obtained  BEFORE administration of either of these  drugs to assure correct results.       HDL Cholesterol 10/16/2020 48  40 - 60 MG/DL Final    LDL, calculated 10/16/2020 77  0 - 100 MG/DL Final    VLDL, calculated 10/16/2020 14  MG/DL Final    CHOL/HDL Ratio 10/16/2020 2.9  0 - 5.0   Final    Sodium 10/16/2020 142  136 - 145 mmol/L Final    Potassium 10/16/2020 4.6  3.5 - 5.5 mmol/L Final    Chloride 10/16/2020 110  100 - 111 mmol/L Final    CO2 10/16/2020 30  21 - 32 mmol/L Final    Anion gap 10/16/2020 2* 3.0 - 18 mmol/L Final    Glucose 10/16/2020 86  74 - 99 mg/dL Final    BUN 10/16/2020 12  7.0 - 18 MG/DL Final    Creatinine 10/16/2020 0.64  0.6 - 1.3 MG/DL Final    BUN/Creatinine ratio 10/16/2020 19  12 - 20   Final    GFR est AA 10/16/2020 >60  >60 ml/min/1.73m2 Final    GFR est non-AA 10/16/2020 >60  >60 ml/min/1.73m2 Final    Comment: (NOTE)  Estimated GFR is calculated using the Modification of Diet in Renal   Disease (MDRD) Study equation, reported for both  Americans   (GFRAA) and non- Americans (GFRNA), and normalized to 1.73m2   body surface area. The physician must decide which value applies to   the patient. The MDRD study equation should only be used in   individuals age 25 or older. It has not been validated for the   following: pregnant women, patients with serious comorbid conditions,   or on certain medications, or persons with extremes of body size,   muscle mass, or nutritional status.  Calcium 10/16/2020 8.8  8.5 - 10.1 MG/DL Final    TSH 10/16/2020 1.29  0.36 - 3.74 uIU/mL Final    T4, Free 10/16/2020 1.0  0.7 - 1.5 NG/DL Final    Vitamin D 25-Hydroxy 10/16/2020 24.1* 30 - 100 ng/mL Final    Comment: (NOTE)  Deficiency               <20 ng/mL  Insufficiency          20-30 ng/mL  Sufficient             ng/mL  Possible toxicity       >100 ng/mL    The Method used is Siemens Advia Centaur currently standardized to a   Center of Disease Control and Prevention (CDC) certified reference   [address]. Samples containing fluorescein dye can produce falsely   elevated values when tested with the ADVIA Centaur Vitamin D Assay. It is recommended that results in the toxic range, >100 ng/mL, be   retested 72 hours post fluorescein exposure.       Chromium, Plasma 10/16/2020 3.7* 0.1 - 2.1 ug/L Final    Comment: (NOTE)  The specimen was not submitted to the testing laboratory  in the preferred certified metal free transfer tube. Abnormal results should be confirmed by collecting the specimens  in royal blue metal free vacutainers and submitting the serum  or plasma in certified metal free transfer tubes. This test was developed and its performance characteristics  determined by [facility]. It has not been cleared or approved  by the Food and Drug Administration. Detection Limit = 0.1  Performed At: [address] [id number]  Jordon Queen MD [id number]      Cobalt, plasma 10/16/2020 <1.0  0.0 - 0.9 ug/L Final    Comment: (NOTE)  This test was developed and its performance characteristics  determined by [facility]. It has not been cleared or approved  by the Food and Drug Administration. Occupational Exposure: MARY 1.0                                 Detection Limit = 1.0  Performed At: [address] [id number]  Jordon Queen MD EJ:[id number]            Follow-up and Dispositions    · Return in about 3 months (around 6/19/2021).

## 2021-03-19 NOTE — PROGRESS NOTES
Camelia Fernandez is a 69 y.o. female (: 1950) presenting to address:    Chief Complaint   Patient presents with   [facility] Establish Care     Transition of Care       Vitals:    21 1137 21 1144   BP: (!) 143/86 132/84   Pulse: 70    Resp: 16    Temp: 98.4 °F (36.9 °C)    TempSrc: Temporal    SpO2: 97%    Weight: 143 lb (64.9 kg)    Height: 5' 3.5\" (1.613 m)        Hearing/Vision:   No exam data present    Learning Assessment:     Learning Assessment 2019   PRIMARY LEARNER Patient   HIGHEST LEVEL OF EDUCATION - PRIMARY LEARNER  GRADUATED HIGH SCHOOL OR GED   BARRIERS PRIMARY LEARNER NONE   PRIMARY LANGUAGE ENGLISH   LEARNER PREFERENCE PRIMARY READING   ANSWERED BY patient    RELATIONSHIP SELF     Depression Screening:     3 most recent PHQ Screens 3/19/2021   Little interest or pleasure in doing things Not at all   Feeling down, depressed, irritable, or hopeless Not at all   Total Score PHQ 2 0     Fall Risk Assessment:     Fall Risk Assessment, last 12 mths 3/19/2021   Able to walk? Yes   Fall in past 12 months? 0   Number of falls in past 12 months -   Fall with injury? -     Abuse Screening:     Abuse Screening Questionnaire 2019   Do you ever feel afraid of your partner? N   Are you in a relationship with someone who physically or mentally threatens you? N   Is it safe for you to go home? Y     Coordination of Care Questionaire:   1. Have you been to the ER, urgent care clinic since your last visit? Hospitalized since your last visit? YES    2. Have you seen or consulted any other health care providers outside of the [address] [place] [address] since your last visit? Include any pap smears or colon screening. NO    Advanced Directive:   1. Do you have an Advanced Directive? NO    2. Would you like information on Advanced Directives?  NO

## 2021-03-20 NOTE — PROGRESS NOTES
Results are within normal limit advised patient about  to stay well-hydrated hydration( her BUN is high )

## 2021-04-23 NOTE — TELEPHONE ENCOUNTER
Patient called requesting medication refill, please advise    Requested Prescriptions     Pending Prescriptions Disp Refills    simvastatin (ZOCOR) 5 mg tablet 90 Tab 0     Sig: TAKE 1 TABLET BY MOUTH ONCE DAILY AT BEDTIME.

## 2021-04-25 DIAGNOSIS — E78.2 MIXED HYPERLIPIDEMIA: Primary | ICD-10-CM

## 2021-04-25 RX ORDER — SIMVASTATIN 5 MG/1
TABLET, FILM COATED ORAL
Qty: 90 TAB | Refills: 1 | Status: SHIPPED | OUTPATIENT
Start: 2021-04-25

## 2021-04-26 RX ORDER — SIMVASTATIN 5 MG/1
TABLET, FILM COATED ORAL
Qty: 90 TAB | Refills: 0 | OUTPATIENT
Start: 2021-04-26

## 2021-05-19 DIAGNOSIS — I10 ESSENTIAL HYPERTENSION: ICD-10-CM

## 2021-05-19 NOTE — TELEPHONE ENCOUNTER
Patient called requesting medication refill. Patient states that she would only like 30 days worth sent to the pharmacy, please advise.

## 2021-05-20 RX ORDER — LISINOPRIL 10 MG/1
10 TABLET ORAL DAILY
Qty: 90 TABLET | Refills: 2 | Status: SHIPPED | OUTPATIENT
Start: 2021-05-20

## 2021-05-20 NOTE — TELEPHONE ENCOUNTER
Last refill was 03/19/2021 qty of 90 with 2 refills. No future appointments.   Last appointment was 03/19/2021

## 2021-07-02 ENCOUNTER — OFFICE VISIT (OUTPATIENT)
Dept: FAMILY MEDICINE CLINIC | Age: 71
End: 2021-07-02
Payer: MEDICARE

## 2021-07-02 VITALS
WEIGHT: 139.8 LBS | RESPIRATION RATE: 15 BRPM | HEART RATE: 74 BPM | DIASTOLIC BLOOD PRESSURE: 83 MMHG | BODY MASS INDEX: 23.87 KG/M2 | OXYGEN SATURATION: 97 % | SYSTOLIC BLOOD PRESSURE: 135 MMHG | TEMPERATURE: 97.3 F | HEIGHT: 64 IN

## 2021-07-02 DIAGNOSIS — N39.0 URINARY TRACT INFECTION WITHOUT HEMATURIA, SITE UNSPECIFIED: ICD-10-CM

## 2021-07-02 DIAGNOSIS — R35.0 FREQUENCY OF URINATION: Primary | ICD-10-CM

## 2021-07-02 LAB
BILIRUB UR QL STRIP: NEGATIVE
GLUCOSE UR-MCNC: NEGATIVE MG/DL
KETONES P FAST UR STRIP-MCNC: NEGATIVE MG/DL
PH UR STRIP: 8.5 [PH] (ref 4.6–8)
PROT UR QL STRIP: NEGATIVE
SP GR UR STRIP: 1.02 (ref 1–1.03)
UA UROBILINOGEN AMB POC: ABNORMAL (ref 0.2–1)
URINALYSIS CLARITY POC: ABNORMAL
URINALYSIS COLOR POC: YELLOW
URINE BLOOD POC: ABNORMAL
URINE LEUKOCYTES POC: ABNORMAL
URINE NITRITES POC: NEGATIVE

## 2021-07-02 PROCEDURE — G8420 CALC BMI NORM PARAMETERS: HCPCS | Performed by: NURSE PRACTITIONER

## 2021-07-02 PROCEDURE — G8536 NO DOC ELDER MAL SCRN: HCPCS | Performed by: NURSE PRACTITIONER

## 2021-07-02 PROCEDURE — G8432 DEP SCR NOT DOC, RNG: HCPCS | Performed by: NURSE PRACTITIONER

## 2021-07-02 PROCEDURE — G8752 SYS BP LESS 140: HCPCS | Performed by: NURSE PRACTITIONER

## 2021-07-02 PROCEDURE — G8754 DIAS BP LESS 90: HCPCS | Performed by: NURSE PRACTITIONER

## 2021-07-02 PROCEDURE — 99213 OFFICE O/P EST LOW 20 MIN: CPT | Performed by: NURSE PRACTITIONER

## 2021-07-02 PROCEDURE — 1101F PT FALLS ASSESS-DOCD LE1/YR: CPT | Performed by: NURSE PRACTITIONER

## 2021-07-02 PROCEDURE — 1090F PRES/ABSN URINE INCON ASSESS: CPT | Performed by: NURSE PRACTITIONER

## 2021-07-02 PROCEDURE — G8427 DOCREV CUR MEDS BY ELIG CLIN: HCPCS | Performed by: NURSE PRACTITIONER

## 2021-07-02 PROCEDURE — G9899 SCRN MAM PERF RSLTS DOC: HCPCS | Performed by: NURSE PRACTITIONER

## 2021-07-02 PROCEDURE — 3017F COLORECTAL CA SCREEN DOC REV: CPT | Performed by: NURSE PRACTITIONER

## 2021-07-02 PROCEDURE — 81003 URINALYSIS AUTO W/O SCOPE: CPT | Performed by: NURSE PRACTITIONER

## 2021-07-02 RX ORDER — NITROFURANTOIN 25; 75 MG/1; MG/1
100 CAPSULE ORAL 2 TIMES DAILY
Qty: 14 CAPSULE | Refills: 0 | Status: SHIPPED | OUTPATIENT
Start: 2021-07-02 | End: 2021-07-09

## 2021-07-02 NOTE — PROGRESS NOTES
Iain Cormier presents today for   Chief Complaint   Patient presents with    Urinary Frequency     Constant urination with burning        Is someone accompanying this pt? no    Is the patient using any DME equipment during OV? no    Depression Screening:  3 most recent PHQ Screens 7/2/2021   Little interest or pleasure in doing things Not at all   Feeling down, depressed, irritable, or hopeless Not at all   Total Score PHQ 2 0       Learning Assessment:  Learning Assessment 11/22/2019   PRIMARY LEARNER Patient   HIGHEST LEVEL OF EDUCATION - PRIMARY LEARNER  GRADUATED HIGH SCHOOL OR GED   BARRIERS PRIMARY LEARNER NONE   PRIMARY LANGUAGE ENGLISH   LEARNER PREFERENCE PRIMARY READING   ANSWERED BY patient    RELATIONSHIP SELF       Travel Screening:    Travel Screening     Question   Response    In the last month, have you been in contact with someone who was confirmed or suspected to have [address] / COVID-19? No / Unsure    Have you had a COVID-19 viral test in the last 14 days? No    Do you have any of the following new or worsening symptoms? None of these    Have you traveled internationally or domestically in the last month? No      Travel History   Travel since 06/02/21     No documented travel since 06/02/21          Health Maintenance reviewed and discussed and ordered per Provider. Health Maintenance Due   Topic Date Due    COVID-19 Vaccine (1) Never done    Shingrix Vaccine Age 50> (1 of 2) Never done    Colorectal Cancer Screening Combo  Never done    Bone Densitometry (Dexa) Screening  Never done   . Coordination of Care:  1. Have you been to the ER, urgent care clinic since your last visit? Hospitalized since your last visit? No.     2. Have you seen or consulted any other health care providers outside of the [address] [place] [address] since your last visit? Include any pap smears or colon screening.  No.

## 2021-07-02 NOTE — PROGRESS NOTES
HPI  Angelo Juarez is a 66y.o. year old female who presents today with possible UTI. Symptoms started a few weeks ago. Has intermittent burning with urination and going to the bathroom more frequently. No blood in the urine or low back pain. No nausea, vomiting, diarrhea. No abdominal pain. 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 ([facility] 9/26/2012    Mixed hyperlipidemia     Muscular weakness     Nausea & vomiting     Numbness     Osteoarthrosis, unspecified whether generalized or localized, unspecified site     Osteopenia     Other ill-defined conditions(799.89)     microscopic hematuria    Senile osteoporosis     Skin rash     SOB (shortness of breath)     Upper respiratory tract infection     Visual disturbance        Past Surgical History:   Procedure Laterality Date    HX HIP REPLACEMENT  9/2008    HX OTHER SURGICAL  12/05    Shoulder    HX OTHER SURGICAL      vein stripping    HX OTHER SURGICAL      mini face lift    HX OTHER SURGICAL      Hip revision    HX VEIN STRIPPING  1987    [place] TOTAL HIP ARTHROPLASTY  2006    right       Social History     Tobacco Use    Smoking status: Never Smoker    Smokeless tobacco: Never Used   Substance Use Topics    Alcohol use: Yes     Comment: 1 monthly    Drug use: No         Current Outpatient Medications:     lisinopriL (PRINIVIL, ZESTRIL) 10 mg tablet, Take 1 Tablet by mouth daily. , Disp: 90 Tablet, Rfl: 2    simvastatin (ZOCOR) 5 mg tablet, TAKE ONE TABLET BY MOUTH EVERY NIGHT AT BEDTIME, Disp: 90 Tab, Rfl: 1    tolterodine ER (DETROL LA) 4 mg ER capsule, Take 1 Cap by mouth daily. , Disp: 90 Cap, Rfl: 1    acetaminophen (TYLENOL) 500 mg tablet, Take 1,000 mg by mouth., Disp: , Rfl:     traZODone (DESYREL) 50 mg tablet, Take 2 Tabs by mouth nightly as needed for Sleep., Disp: 60 Tab, Rfl: 5    verapamil ER (CALAN-SR) 180 mg CR tablet, Take 1 Tab by mouth nightly., Disp: 90 Tab, Rfl: 1    melatonin 3 mg tablet, Take 3 mg by mouth nightly., Disp: , Rfl:     ergocalciferol, vitamin D2, (VITAMIN D2 PO), Take  by mouth daily. , Disp: , Rfl:      No Known Allergies     Review of Systems   Constitutional: Negative for chills, fever and malaise/fatigue. Respiratory: Negative for cough and shortness of breath. Cardiovascular: Negative for chest pain, palpitations and leg swelling. Gastrointestinal: Negative for abdominal pain, diarrhea, nausea and vomiting. Genitourinary: Positive for dysuria and frequency. Negative for flank pain, hematuria and urgency. PE  /83 (BP 1 Location: Left upper arm, BP Patient Position: Sitting, BP Cuff Size: Adult)   Pulse 74   Temp 97.3 °F (36.3 °C) (Temporal)   Resp 15   Ht 5' 3.5\" (1.613 m)   Wt 139 lb 12.8 oz (63.4 kg)   SpO2 97%   BMI 24.38 kg/m²     Physical Exam  Vitals reviewed. Constitutional:       General: She is not in acute distress. Appearance: Normal appearance. HENT:      Head: Normocephalic and atraumatic. Cardiovascular:      Rate and Rhythm: Normal rate and regular rhythm. Heart sounds: S1 normal and S2 normal. No murmur heard. No friction rub. No gallop. No S3 or S4 sounds. Pulmonary:      Effort: Pulmonary effort is normal.      Breath sounds: Normal breath sounds. No wheezing, rhonchi or rales. Abdominal:      General: Bowel sounds are normal. There is no distension. Palpations: There is no hepatomegaly or splenomegaly. Tenderness: There is no abdominal tenderness. There is no right CVA tenderness, left CVA tenderness, guarding or rebound. Musculoskeletal:      Right lower leg: No edema. Left lower leg: No edema. Skin:     General: Skin is warm and dry. Capillary Refill: Capillary refill takes less than 2 seconds. Neurological:      Mental Status: She is alert and oriented to person, place, and time.          Assessment/Plan  1. UTI  Macrobid 100 BID X7 days  Push PO fluids  Urine culture  FU symptoms worsen or do not improve

## 2021-07-09 DIAGNOSIS — N32.81 OVERACTIVE BLADDER: ICD-10-CM

## 2021-07-09 RX ORDER — TOLTERODINE 4 MG/1
4 CAPSULE, EXTENDED RELEASE ORAL DAILY
Qty: 30 CAPSULE | Refills: 0 | Status: SHIPPED | OUTPATIENT
Start: 2021-07-09 | End: 2021-07-12 | Stop reason: SDUPTHER

## 2021-07-09 NOTE — TELEPHONE ENCOUNTER
Patient called stating that she was seen 7/2/21 by SHAUNA johnson and never received her prescription, please advise. Requested Prescriptions     Pending Prescriptions Disp Refills    tolterodine ER (DETROL LA) 4 mg ER capsule 90 Capsule 1     Sig: Take 1 Capsule by mouth daily.

## 2021-07-09 NOTE — TELEPHONE ENCOUNTER
She saw me for a UTI. Refilling this medication was not discussed and I am not the primary prescriber of it. I will give her 30 days, no refills to hold her until Dr. Odette De La Torre gets back.

## 2021-07-12 DIAGNOSIS — N32.81 OVERACTIVE BLADDER: ICD-10-CM

## 2021-07-12 RX ORDER — TOLTERODINE 4 MG/1
4 CAPSULE, EXTENDED RELEASE ORAL DAILY
Qty: 90 CAPSULE | Refills: 0 | Status: SHIPPED | OUTPATIENT
Start: 2021-07-12

## 2021-07-12 NOTE — TELEPHONE ENCOUNTER
Patient notified . She uses [facility] for medication and its more expensive to do 30 days . Also is getting ready to go [place] to [place] and that wouldn't bee enough to last her until she returns . I spoke with Claire Galeazzi and patient notified she would make a exception this one time.

## 2021-07-12 NOTE — TELEPHONE ENCOUNTER
Per our conversation please submit one time 90 days for patient since Dr Tracy Penn is out of the office .

## 2021-07-20 ENCOUNTER — OFFICE VISIT (OUTPATIENT)
Dept: FAMILY MEDICINE CLINIC | Age: 71
End: 2021-07-20
Payer: MEDICARE

## 2021-07-20 ENCOUNTER — APPOINTMENT (OUTPATIENT)
Dept: FAMILY MEDICINE CLINIC | Age: 71
End: 2021-07-20

## 2021-07-20 ENCOUNTER — HOSPITAL ENCOUNTER (OUTPATIENT)
Dept: LAB | Age: 71
Discharge: HOME | End: 2021-07-20
Payer: MEDICARE

## 2021-07-20 VITALS
HEIGHT: 64 IN | DIASTOLIC BLOOD PRESSURE: 80 MMHG | RESPIRATION RATE: 16 BRPM | HEART RATE: 70 BPM | OXYGEN SATURATION: 97 % | WEIGHT: 139.8 LBS | BODY MASS INDEX: 23.87 KG/M2 | SYSTOLIC BLOOD PRESSURE: 124 MMHG | TEMPERATURE: 97.2 F

## 2021-07-20 DIAGNOSIS — Z78.0 MENOPAUSE: ICD-10-CM

## 2021-07-20 DIAGNOSIS — T84.030S MECHANICAL LOOSENING OF INTERNAL RIGHT HIP PROSTHETIC JOINT, SEQUELA: ICD-10-CM

## 2021-07-20 DIAGNOSIS — E78.2 MIXED HYPERLIPIDEMIA: ICD-10-CM

## 2021-07-20 DIAGNOSIS — R79.0 ABNORMAL BLOOD LEVEL OF CHROMIUM: ICD-10-CM

## 2021-07-20 DIAGNOSIS — G47.9 SLEEP DISTURBANCE: ICD-10-CM

## 2021-07-20 DIAGNOSIS — I10 ESSENTIAL HYPERTENSION: Primary | ICD-10-CM

## 2021-07-20 DIAGNOSIS — N39.498 OTHER URINARY INCONTINENCE: ICD-10-CM

## 2021-07-20 DIAGNOSIS — I10 ESSENTIAL HYPERTENSION: ICD-10-CM

## 2021-07-20 DIAGNOSIS — N39.0 URINARY TRACT INFECTION WITHOUT HEMATURIA, SITE UNSPECIFIED: ICD-10-CM

## 2021-07-20 DIAGNOSIS — E55.9 VITAMIN D DEFICIENCY: ICD-10-CM

## 2021-07-20 LAB
25(OH)D3 SERPL-MCNC: 29 NG/ML (ref 30–100)
ALBUMIN SERPL-MCNC: 4.1 G/DL (ref 3.4–5)
ALBUMIN/GLOB SERPL: 1.4 {RATIO} (ref 0.8–1.7)
ALP SERPL-CCNC: 88 U/L (ref 45–117)
ALT SERPL-CCNC: 24 U/L (ref 13–56)
ANION GAP SERPL CALC-SCNC: 4 MMOL/L (ref 3–18)
AST SERPL-CCNC: 19 U/L (ref 10–38)
BASOPHILS # BLD: 0.1 K/UL (ref 0–0.1)
BASOPHILS NFR BLD: 1 % (ref 0–2)
BILIRUB SERPL-MCNC: 0.3 MG/DL (ref 0.2–1)
BUN SERPL-MCNC: 14 MG/DL (ref 7–18)
BUN/CREAT SERPL: 22 (ref 12–20)
CALCIUM SERPL-MCNC: 9 MG/DL (ref 8.5–10.1)
CHLORIDE SERPL-SCNC: 108 MMOL/L (ref 100–111)
CHOLEST SERPL-MCNC: 164 MG/DL
CO2 SERPL-SCNC: 28 MMOL/L (ref 21–32)
CREAT SERPL-MCNC: 0.64 MG/DL (ref 0.6–1.3)
DIFFERENTIAL METHOD BLD: ABNORMAL
EOSINOPHIL # BLD: 0.1 K/UL (ref 0–0.4)
EOSINOPHIL NFR BLD: 2 % (ref 0–5)
ERYTHROCYTE [DISTWIDTH] IN BLOOD BY AUTOMATED COUNT: 13.4 % (ref 11.6–14.5)
GLOBULIN SER CALC-MCNC: 2.9 G/DL (ref 2–4)
GLUCOSE SERPL-MCNC: 85 MG/DL (ref 74–99)
HCT VFR BLD AUTO: 46.1 % (ref 35–45)
HDLC SERPL-MCNC: 53 MG/DL (ref 40–60)
HDLC SERPL: 3.1 {RATIO} (ref 0–5)
HGB BLD-MCNC: 14.3 G/DL (ref 12–16)
LDLC SERPL CALC-MCNC: 84.2 MG/DL (ref 0–100)
LIPID PROFILE,FLP: NORMAL
LYMPHOCYTES # BLD: 1.9 K/UL (ref 0.9–3.6)
LYMPHOCYTES NFR BLD: 31 % (ref 21–52)
MCH RBC QN AUTO: 29.1 PG (ref 24–34)
MCHC RBC AUTO-ENTMCNC: 31 G/DL (ref 31–37)
MCV RBC AUTO: 93.9 FL (ref 74–97)
MONOCYTES # BLD: 0.5 K/UL (ref 0.05–1.2)
MONOCYTES NFR BLD: 8 % (ref 3–10)
NEUTS SEG # BLD: 3.6 K/UL (ref 1.8–8)
NEUTS SEG NFR BLD: 59 % (ref 40–73)
PLATELET # BLD AUTO: 306 K/UL (ref 135–420)
PMV BLD AUTO: 9.2 FL (ref 9.2–11.8)
POTASSIUM SERPL-SCNC: 4.5 MMOL/L (ref 3.5–5.5)
PROT SERPL-MCNC: 7 G/DL (ref 6.4–8.2)
RBC # BLD AUTO: 4.91 M/UL (ref 4.2–5.3)
SODIUM SERPL-SCNC: 140 MMOL/L (ref 136–145)
TRIGL SERPL-MCNC: 134 MG/DL (ref ?–150)
VLDLC SERPL CALC-MCNC: 26.8 MG/DL
WBC # BLD AUTO: 6 K/UL (ref 4.6–13.2)

## 2021-07-20 PROCEDURE — G8752 SYS BP LESS 140: HCPCS | Performed by: LEGAL MEDICINE

## 2021-07-20 PROCEDURE — 36415 COLL VENOUS BLD VENIPUNCTURE: CPT

## 2021-07-20 PROCEDURE — G8536 NO DOC ELDER MAL SCRN: HCPCS | Performed by: LEGAL MEDICINE

## 2021-07-20 PROCEDURE — 85025 COMPLETE CBC W/AUTO DIFF WBC: CPT

## 2021-07-20 PROCEDURE — 1090F PRES/ABSN URINE INCON ASSESS: CPT | Performed by: LEGAL MEDICINE

## 2021-07-20 PROCEDURE — 82306 VITAMIN D 25 HYDROXY: CPT

## 2021-07-20 PROCEDURE — 80061 LIPID PANEL: CPT

## 2021-07-20 PROCEDURE — 99214 OFFICE O/P EST MOD 30 MIN: CPT | Performed by: LEGAL MEDICINE

## 2021-07-20 PROCEDURE — G8427 DOCREV CUR MEDS BY ELIG CLIN: HCPCS | Performed by: LEGAL MEDICINE

## 2021-07-20 PROCEDURE — 80053 COMPREHEN METABOLIC PANEL: CPT

## 2021-07-20 PROCEDURE — 1101F PT FALLS ASSESS-DOCD LE1/YR: CPT | Performed by: LEGAL MEDICINE

## 2021-07-20 PROCEDURE — G8510 SCR DEP NEG, NO PLAN REQD: HCPCS | Performed by: LEGAL MEDICINE

## 2021-07-20 PROCEDURE — 82495 ASSAY OF CHROMIUM: CPT

## 2021-07-20 PROCEDURE — 87086 URINE CULTURE/COLONY COUNT: CPT

## 2021-07-20 PROCEDURE — G8754 DIAS BP LESS 90: HCPCS | Performed by: LEGAL MEDICINE

## 2021-07-20 PROCEDURE — G8420 CALC BMI NORM PARAMETERS: HCPCS | Performed by: LEGAL MEDICINE

## 2021-07-20 PROCEDURE — G9899 SCRN MAM PERF RSLTS DOC: HCPCS | Performed by: LEGAL MEDICINE

## 2021-07-20 PROCEDURE — 3017F COLORECTAL CA SCREEN DOC REV: CPT | Performed by: LEGAL MEDICINE

## 2021-07-20 RX ORDER — TRAZODONE HYDROCHLORIDE 100 MG/1
100 TABLET ORAL
Qty: 90 TABLET | Refills: 1 | Status: SHIPPED | OUTPATIENT
Start: 2021-07-20 | End: 2021-10-18

## 2021-07-20 RX ORDER — VERAPAMIL HYDROCHLORIDE 180 MG/1
180 TABLET, EXTENDED RELEASE ORAL
Qty: 90 TABLET | Refills: 1 | Status: SHIPPED | OUTPATIENT
Start: 2021-07-20

## 2021-07-20 NOTE — PROGRESS NOTES
Leighton Pete presents today for   Chief Complaint   Patient presents with    Follow-up     Lab questions     Medication Refill       Is someone accompanying this pt? no    Is the patient using any DME equipment during OV? no    Depression Screening:  3 most recent PHQ Screens 7/20/2021   [facility] Not Done Patient Decline   Little interest or pleasure in doing things Not at all   Feeling down, depressed, irritable, or hopeless Not at all   Total Score PHQ 2 0       Learning Assessment:  Learning Assessment 11/22/2019   PRIMARY LEARNER Patient   HIGHEST LEVEL OF EDUCATION - PRIMARY LEARNER  GRADUATED HIGH SCHOOL OR GED   BARRIERS PRIMARY LEARNER NONE   PRIMARY LANGUAGE ENGLISH   LEARNER PREFERENCE PRIMARY READING   ANSWERED BY patient    RELATIONSHIP SELF       Travel Screening:    Travel Screening     Question   Response    In the last month, have you been in contact with someone who was confirmed or suspected to have [address] / COVID-19? No / Unsure    Have you had a COVID-19 viral test in the last 14 days? No    Do you have any of the following new or worsening symptoms? None of these    Have you traveled internationally or domestically in the last month? No      Travel History   Travel since 06/20/21     No documented travel since 06/20/21          Health Maintenance reviewed and discussed and ordered per Provider. Health Maintenance Due   Topic Date Due    Colorectal Cancer Screening Combo  Never done    Shingrix Vaccine Age 50> (1 of 2) Never done    Bone Densitometry (Dexa) Screening  Never done   . Coordination of Care:  1. Have you been to the ER, urgent care clinic since your last visit? Hospitalized since your last visit? no    2. Have you seen or consulted any other health care providers outside of the [address] [place] [address] since your last visit? Include any pap smears or colon screening.  no

## 2021-07-20 NOTE — PROGRESS NOTES
[facility]     Chief Complaint   Patient presents with    Follow-up     Lab questions     Medication Refill     Vitals:    07/20/21 1018   BP: 124/80   Pulse: 70   Resp: 16   Temp: 97.2 °F (36.2 °C)   TempSrc: Temporal   SpO2: 97%   Weight: 139 lb 12.8 oz (63.4 kg)   Height: 5' 3.5\" (1.613 m)   PainSc:   4   PainLoc: Generalized         HPI:Pateint is here for follow up and medication refills    She is relocating back to the [facility]   Doing well no acute changes      She is due for blood work will be done today     H/O polyp has not provided old records or doctor's name, she might be due for colonoscopy     Past Medical History:   Diagnosis Date    Benign hypertension     Bone pain     Chronic pain     upper and mid back    Constipation     [facility]aneous [facility]     Diarrhea     Difficulty hearing     Dyspnea     Esophageal yeast infection ([facility] 2011    Excessive urinary volume     Heat intolerance     Hypertension     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 ([facility] 9/26/2012    Mixed hyperlipidemia     Muscular weakness     Nausea & vomiting     Numbness     Osteoarthrosis, unspecified whether generalized or localized, unspecified site     Osteopenia     Other ill-defined conditions(799.89)     microscopic hematuria    Senile osteoporosis     Skin rash     SOB (shortness of breath)     Upper respiratory tract infection     Visual disturbance      Past Surgical History:   Procedure Laterality Date    HX HIP REPLACEMENT  9/2008    HX OTHER SURGICAL  12/05    Shoulder    HX OTHER SURGICAL      vein stripping    HX OTHER SURGICAL      mini face lift    HX OTHER SURGICAL      Hip revision    HX VEIN STRIPPING  1987    [place] TOTAL HIP ARTHROPLASTY  2006    right     Social History     Tobacco Use    Smoking status: Never Smoker    Smokeless tobacco: Never Used   Substance Use Topics    Alcohol use: Yes     Comment: 1 monthly       Family History   Problem Relation Age of Onset    Hypertension Mother     Stroke Father    [facility] Arthritis-rheumatoid Sister        Review of Systems   Constitutional: Negative for chills, fever, malaise/fatigue and weight loss. HENT: Negative for congestion, ear discharge, ear pain, hearing loss, nosebleeds, sinus pain and sore throat. Eyes: Negative for blurred vision, double vision and discharge. Respiratory: Negative for cough, hemoptysis, sputum production, shortness of breath and wheezing. Cardiovascular: Negative for chest pain, palpitations, claudication and leg swelling. Gastrointestinal: Negative for abdominal pain, blood in stool, constipation, diarrhea, melena, nausea and vomiting. Genitourinary: Positive for frequency and urgency. Negative for dysuria, flank pain and hematuria. Musculoskeletal: Negative for back pain, falls, joint pain, myalgias and neck pain. Skin: Negative for itching and rash. Neurological: Negative for dizziness, tingling, sensory change, speech change, focal weakness, weakness and headaches. Psychiatric/Behavioral: Negative for depression, hallucinations, substance abuse and suicidal ideas. The patient is not nervous/anxious and does not have insomnia. Physical Exam  Vitals and nursing note reviewed. Constitutional:       General: She is not in acute distress. Appearance: She is well-developed. She is not diaphoretic. HENT:      Head: Normocephalic and atraumatic. Eyes:      General: No scleral icterus. Right eye: No discharge. Left eye: No discharge. Conjunctiva/sclera: Conjunctivae normal.      Pupils: Pupils are equal, round, and reactive to light. Neck:      Thyroid: No thyromegaly. Cardiovascular:      Rate and Rhythm: Normal rate and regular rhythm. Heart sounds: Normal heart sounds. Pulmonary:      Effort: Pulmonary effort is normal. No respiratory distress. Breath sounds: Normal breath sounds. No wheezing or rales.    Chest: Chest wall: No tenderness. Abdominal:      General: There is no distension. Palpations: Abdomen is soft. Tenderness: There is no abdominal tenderness. There is no rebound. Musculoskeletal:         General: No tenderness or deformity. Normal range of motion. Lymphadenopathy:      Cervical: No cervical adenopathy. Skin:     General: Skin is warm and dry. Coloration: Skin is not pale. Findings: No erythema or rash. Neurological:      Mental Status: She is alert and oriented to person, place, and time. Cranial Nerves: No cranial nerve deficit. Coordination: Coordination normal.   Psychiatric:         Behavior: Behavior normal.         Thought Content: Thought content normal.         Judgment: Judgment normal.          Assessment and plan     Plan of care has been discussed with the patient, he agrees to the plan and verbalized understanding. All his questions were answered  More than 50% of the time spent in this visit was counseling the patient about  illness and treatment options         1. Essential hypertension    - METABOLIC PANEL, COMPREHENSIVE; Future  - verapamil ER (CALAN-SR) 180 mg CR tablet; Take 1 Tablet by mouth nightly. Dispense: 90 Tablet; Refill: 1  - LIPID PANEL; Future  - CBC WITH AUTOMATED DIFF; Future    2. Other urinary incontinence      3. Mixed hyperlipidemia    - METABOLIC PANEL, COMPREHENSIVE; Future  - CBC WITH AUTOMATED DIFF; Future    4. Sleep disturbance    - traZODone (DESYREL) 100 mg tablet; Take 1 Tablet by mouth nightly for 90 days. Dispense: 90 Tablet; Refill: 1    5. Vitamin D deficiency    - VITAMIN D, 25 HYDROXY; Future    6. 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 Abnormal blood level of chromium    - CHROMIUM; Future    8. Menopause    - DEXA BONE DENSITY STUDY AXIAL;  Future    Current Outpatient Medications   Medication Sig Dispense Refill    verapamil ER (CALAN-SR) 180 mg CR tablet Take 1 Tablet by mouth nightly. 90 Tablet 1    traZODone (DESYREL) 100 mg tablet Take 1 Tablet by mouth nightly for 90 days. 90 Tablet 1    tolterodine ER (DETROL LA) 4 mg ER capsule Take 1 Capsule by mouth daily. 90 Capsule 0    lisinopriL (PRINIVIL, ZESTRIL) 10 mg tablet Take 1 Tablet by mouth daily. 90 Tablet 2    simvastatin (ZOCOR) 5 mg tablet TAKE ONE TABLET BY MOUTH EVERY NIGHT AT BEDTIME 90 Tab 1    acetaminophen (TYLENOL) 500 mg tablet Take 1,000 mg by mouth.  melatonin 3 mg tablet Take 3 mg by mouth nightly.  ergocalciferol, vitamin D2, (VITAMIN D2 PO) Take  by mouth daily.          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 09/26/2012    Visual disturbance 09/11/2012    Difficulty hearing 09/11/2012    Constipation 09/11/2012    Diarrhea 09/11/2012    Bone pain 09/11/2012    Muscular weakness 09/11/2012    Skin rash 09/11/2012    Numbness 09/11/2012    Excessive urinary volume 09/11/2012    Heat intolerance 09/11/2012    Osteoarthrosis, unspecified whether generalized or localized, unspecified site 09/04/2012    SOB (shortness of breath) 09/04/2012    Upper respiratory tract infection 09/04/2012    Osteopenia 09/04/2012    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    Dyspnea 09/04/2012    Hypertension 09/04/2012    Esophageal yeast infection ([facility] 09/04/2012    Mixed hyperlipidemia 08/20/2012    Benign hypertension 08/20/2012    Senile osteoporosis 08/20/2012     Results for orders placed or performed in visit on 07/02/21   AMB POC URINALYSIS DIP STICK AUTO W/O MICRO   Result Value Ref Range    Color (UA POC) Yellow     Clarity (UA POC) Cloudy     Glucose (UA POC) Negative Negative    Bilirubin (UA POC) Negative Negative    Ketones (UA POC) Negative Negative    Specific gravity (UA POC) 1.020 1.001 - 1.035    Blood (UA POC) Trace Negative    pH (UA POC) 8.5 (A) 4.6 - 8.0    Protein (UA POC) Negative Negative    Urobilinogen (UA POC) 0.2 mg/dL 0.2 - 1    Nitrites (UA POC) Negative Negative    Leukocyte esterase (UA POC) Trace Negative     Office Visit on 07/02/2021   Component Date Value Ref Range Status    Color (UA POC) 07/02/2021 Yellow   Final    Clarity (UA POC) 07/02/2021 Cloudy   Final    Glucose (UA POC) 07/02/2021 Negative  Negative Final    Bilirubin (UA POC) 07/02/2021 Negative  Negative Final    Ketones (UA POC) 07/02/2021 Negative  Negative Final    Specific gravity (UA POC) 07/02/2021 1.020  1.001 - 1.035 Final    Blood (UA POC) 07/02/2021 Trace  Negative Final    pH (UA POC) 07/02/2021 8.5* 4.6 - 8.0 Final    Protein (UA POC) 07/02/2021 Negative  Negative Final    Urobilinogen (UA POC) 07/02/2021 0.2 mg/dL  0.2 - 1 Final    Nitrites (UA POC) 07/02/2021 Negative  Negative Final    Leukocyte esterase (UA POC) 07/02/2021 Trace  Negative Final

## 2021-07-21 LAB
BACTERIA SPEC CULT: NORMAL
SERVICE CMNT-IMP: NORMAL

## 2021-07-22 LAB — CR SERPL-MCNC: 3 UG/L (ref 0.1–2.1)

## 2021-07-27 ENCOUNTER — HOSPITAL ENCOUNTER (OUTPATIENT)
Dept: BONE DENSITY | Age: 71
Discharge: HOME | End: 2021-07-27
Attending: LEGAL MEDICINE
Payer: MEDICARE

## 2021-07-27 DIAGNOSIS — Z78.0 MENOPAUSE: ICD-10-CM

## 2021-07-27 PROCEDURE — 77080 DXA BONE DENSITY AXIAL: CPT

## 2021-07-31 NOTE — PROGRESS NOTES
BMD measures consistent with osteoporosis. BMD measures consistent with osteopenia.     This is  osteoporosis, patient need to increase physical activity weightbearing exercise, walking and cardiovascular exercises as tolerated, to be adherent to vitamin D supplements, calcium rich diet, and to take calcium supplements, t test to be repeated in 2 years   and need to consider treatment

## (undated) DEVICE — U-DRAPE: Brand: CONVERTORS

## (undated) DEVICE — PHYSIO-STIM BONE GROWTH STIMULATOR: Brand: PHYSIO-STIM

## (undated) DEVICE — SYR 10ML CTRL LR LCK NSAF LF --

## (undated) DEVICE — INTENDED FOR TISSUE SEPARATION, AND OTHER PROCEDURES THAT REQUIRE A SHARP SURGICAL BLADE TO PUNCTURE OR CUT.: Brand: BARD-PARKER ® CARBON RIB-BACK BLADES

## (undated) DEVICE — KENDALL SCD EXPRESS SLEEVES, KNEE LENGTH, MEDIUM: Brand: KENDALL SCD

## (undated) DEVICE — STOCKINETTE IMPERV REG 9X48IN --

## (undated) DEVICE — OCCLUSIVE GAUZE STRIP,3% BISMUTH TRIBROMOPHENATE IN PETROLATUM BLEND: Brand: XEROFORM

## (undated) DEVICE — BLADE SAW W7XL295MM THK038MM CUT THK043MM REPL SAG OSC

## (undated) DEVICE — TAPE CST XF SET SPEC 4INX5YD --

## (undated) DEVICE — ABDOMINAL PAD: Brand: DERMACEA

## (undated) DEVICE — COTTON UNDERCAST PADDING,REGULAR FINISH: Brand: WEBRIL

## (undated) DEVICE — NEEDLE HYPO 25GA L1.5IN BLU POLYPR HUB S STL REG BVL STR

## (undated) DEVICE — KIT PROC EXTRM HND FT CUST LF --

## (undated) DEVICE — DISPOSABLE TOURNIQUET CUFF SINGLE BLADDER, SINGLE PORT AND QUICK CONNECT CONNECTOR: Brand: COLOR CUFF

## (undated) DEVICE — SOLUTION IV 1000ML 0.9% SOD CHL

## (undated) DEVICE — NDL PRT INJ NSAF BLNT 18GX1.5 --

## (undated) DEVICE — PREP SKN CHLRAPRP 26ML TNT -- CONVERT TO ITEM 373320

## (undated) DEVICE — KERLIX BANDAGE ROLL: Brand: KERLIX